# Patient Record
Sex: FEMALE | Race: WHITE | NOT HISPANIC OR LATINO | Employment: UNEMPLOYED | URBAN - METROPOLITAN AREA
[De-identification: names, ages, dates, MRNs, and addresses within clinical notes are randomized per-mention and may not be internally consistent; named-entity substitution may affect disease eponyms.]

---

## 2017-11-05 ENCOUNTER — HOSPITAL ENCOUNTER (INPATIENT)
Facility: HOSPITAL | Age: 42
LOS: 1 days | Discharge: HOME | DRG: 580 | End: 2017-11-06
Attending: EMERGENCY MEDICINE | Admitting: HOSPITALIST
Payer: OTHER GOVERNMENT
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
## 2017-11-05 DIAGNOSIS — S11.90XA DOG BITE OF NECK, INITIAL ENCOUNTER: ICD-10-CM

## 2017-11-05 DIAGNOSIS — L03.221 CELLULITIS OF NECK: Primary | ICD-10-CM

## 2017-11-05 DIAGNOSIS — S11.95XA: ICD-10-CM

## 2017-11-05 PROBLEM — Z72.0 TOBACCO USER: Status: ACTIVE | Noted: 2017-11-05

## 2017-11-05 PROBLEM — R03.0 ELEVATED BLOOD PRESSURE READING: Status: ACTIVE | Noted: 2017-11-05

## 2017-11-05 LAB
ALBUMIN SERPL BCP-MCNC: 4.3 G/DL
ALP SERPL-CCNC: 106 U/L
ALT SERPL W/O P-5'-P-CCNC: 45 U/L
ANION GAP SERPL CALC-SCNC: 15 MMOL/L
AST SERPL-CCNC: 81 U/L
BASOPHILS # BLD AUTO: 0.08 K/UL
BASOPHILS NFR BLD: 0.9 %
BILIRUB SERPL-MCNC: 0.3 MG/DL
BUN SERPL-MCNC: 3 MG/DL
CALCIUM SERPL-MCNC: 10 MG/DL
CHLORIDE SERPL-SCNC: 105 MMOL/L
CO2 SERPL-SCNC: 23 MMOL/L
CREAT SERPL-MCNC: 0.7 MG/DL
DIFFERENTIAL METHOD: ABNORMAL
EOSINOPHIL # BLD AUTO: 0.2 K/UL
EOSINOPHIL NFR BLD: 1.7 %
ERYTHROCYTE [DISTWIDTH] IN BLOOD BY AUTOMATED COUNT: 12.7 %
EST. GFR  (AFRICAN AMERICAN): >60 ML/MIN/1.73 M^2
EST. GFR  (NON AFRICAN AMERICAN): >60 ML/MIN/1.73 M^2
GLUCOSE SERPL-MCNC: 67 MG/DL
HCT VFR BLD AUTO: 45 %
HGB BLD-MCNC: 15.7 G/DL
IMM GRANULOCYTES # BLD AUTO: 0.03 K/UL
IMM GRANULOCYTES NFR BLD AUTO: 0.3 %
LYMPHOCYTES # BLD AUTO: 1.6 K/UL
LYMPHOCYTES NFR BLD: 16.8 %
MCH RBC QN AUTO: 34 PG
MCHC RBC AUTO-ENTMCNC: 34.9 G/DL
MCV RBC AUTO: 97 FL
MONOCYTES # BLD AUTO: 0.7 K/UL
MONOCYTES NFR BLD: 7.6 %
NEUTROPHILS # BLD AUTO: 6.8 K/UL
NEUTROPHILS NFR BLD: 72.7 %
NRBC BLD-RTO: 0 /100 WBC
PLATELET # BLD AUTO: 258 K/UL
PMV BLD AUTO: 9.1 FL
POTASSIUM SERPL-SCNC: 4 MMOL/L
PROT SERPL-MCNC: 8.4 G/DL
RBC # BLD AUTO: 4.62 M/UL
SODIUM SERPL-SCNC: 143 MMOL/L
WBC # BLD AUTO: 9.39 K/UL

## 2017-11-05 PROCEDURE — S0077 INJECTION, CLINDAMYCIN PHOSP: HCPCS | Performed by: PHYSICIAN ASSISTANT

## 2017-11-05 PROCEDURE — 96365 THER/PROPH/DIAG IV INF INIT: CPT

## 2017-11-05 PROCEDURE — 63600175 PHARM REV CODE 636 W HCPCS: Performed by: HOSPITALIST

## 2017-11-05 PROCEDURE — 87077 CULTURE AEROBIC IDENTIFY: CPT

## 2017-11-05 PROCEDURE — 87070 CULTURE OTHR SPECIMN AEROBIC: CPT

## 2017-11-05 PROCEDURE — 99285 EMERGENCY DEPT VISIT HI MDM: CPT | Mod: ,,, | Performed by: PHYSICIAN ASSISTANT

## 2017-11-05 PROCEDURE — 80053 COMPREHEN METABOLIC PANEL: CPT

## 2017-11-05 PROCEDURE — 87076 CULTURE ANAEROBE IDENT EACH: CPT

## 2017-11-05 PROCEDURE — 99222 1ST HOSP IP/OBS MODERATE 55: CPT | Mod: ,,, | Performed by: HOSPITALIST

## 2017-11-05 PROCEDURE — 99285 EMERGENCY DEPT VISIT HI MDM: CPT | Mod: 25

## 2017-11-05 PROCEDURE — 85025 COMPLETE CBC W/AUTO DIFF WBC: CPT

## 2017-11-05 PROCEDURE — 87075 CULTR BACTERIA EXCEPT BLOOD: CPT

## 2017-11-05 PROCEDURE — 96367 TX/PROPH/DG ADDL SEQ IV INF: CPT

## 2017-11-05 PROCEDURE — 0J940ZZ DRAINAGE OF RIGHT NECK SUBCUTANEOUS TISSUE AND FASCIA, OPEN APPROACH: ICD-10-PCS | Performed by: OTOLARYNGOLOGY

## 2017-11-05 PROCEDURE — 87040 BLOOD CULTURE FOR BACTERIA: CPT

## 2017-11-05 PROCEDURE — 25000003 PHARM REV CODE 250: Performed by: PHYSICIAN ASSISTANT

## 2017-11-05 PROCEDURE — 87186 SC STD MICRODIL/AGAR DIL: CPT

## 2017-11-05 PROCEDURE — 87205 SMEAR GRAM STAIN: CPT

## 2017-11-05 PROCEDURE — 12000002 HC ACUTE/MED SURGE SEMI-PRIVATE ROOM

## 2017-11-05 RX ORDER — CIPROFLOXACIN 2 MG/ML
400 INJECTION, SOLUTION INTRAVENOUS
Status: DISCONTINUED | OUTPATIENT
Start: 2017-11-05 | End: 2017-11-06 | Stop reason: HOSPADM

## 2017-11-05 RX ORDER — ACETAMINOPHEN 325 MG/1
650 TABLET ORAL EVERY 8 HOURS PRN
Status: DISCONTINUED | OUTPATIENT
Start: 2017-11-05 | End: 2017-11-06 | Stop reason: HOSPADM

## 2017-11-05 RX ORDER — CLINDAMYCIN PHOSPHATE 900 MG/50ML
900 INJECTION, SOLUTION INTRAVENOUS
Status: DISCONTINUED | OUTPATIENT
Start: 2017-11-06 | End: 2017-11-06 | Stop reason: HOSPADM

## 2017-11-05 RX ORDER — ONDANSETRON 8 MG/1
8 TABLET, ORALLY DISINTEGRATING ORAL EVERY 8 HOURS PRN
Status: DISCONTINUED | OUTPATIENT
Start: 2017-11-05 | End: 2017-11-06 | Stop reason: HOSPADM

## 2017-11-05 RX ORDER — SODIUM CHLORIDE 0.9 % (FLUSH) 0.9 %
3 SYRINGE (ML) INJECTION
Status: DISCONTINUED | OUTPATIENT
Start: 2017-11-05 | End: 2017-11-06 | Stop reason: HOSPADM

## 2017-11-05 RX ORDER — TRAMADOL HYDROCHLORIDE 50 MG/1
50 TABLET ORAL EVERY 6 HOURS PRN
Status: DISCONTINUED | OUTPATIENT
Start: 2017-11-05 | End: 2017-11-06 | Stop reason: HOSPADM

## 2017-11-05 RX ORDER — CLINDAMYCIN PHOSPHATE 900 MG/50ML
900 INJECTION, SOLUTION INTRAVENOUS
Status: COMPLETED | OUTPATIENT
Start: 2017-11-05 | End: 2017-11-05

## 2017-11-05 RX ORDER — RAMELTEON 8 MG/1
8 TABLET ORAL NIGHTLY PRN
Status: DISCONTINUED | OUTPATIENT
Start: 2017-11-05 | End: 2017-11-06 | Stop reason: HOSPADM

## 2017-11-05 RX ADMIN — CIPROFLOXACIN 400 MG: 2 INJECTION, SOLUTION INTRAVENOUS at 10:11

## 2017-11-05 RX ADMIN — CLINDAMYCIN IN 5 PERCENT DEXTROSE 900 MG: 18 INJECTION, SOLUTION INTRAVENOUS at 05:11

## 2017-11-05 NOTE — ED TRIAGE NOTES
Presents to ER for being bitten by her dog on the right submandibular region this past Tuesday.  Today the area slightly painful, swollen and is oozing yellow exudate. Was seen at [facility] today and was given Clindamycin 600mg IM plus a DTAP.    GENERAL: The patient is well-developed and well-nourished in no apparent distress. Alert and oriented x4.                                                HEENT: Head is normocephalic and atraumatic. Extraocular muscles are intact. Pupils are equal, round, and reactive to light and accommodation. Nares appeared normal. Mouth is well hydrated and without lesions. Mucous membranes are moist. Posterior pharynx clear of any exudate or lesions.    NECK: Supple. No carotid bruits. No lymphadenopathy or thyromegaly.    LUNGS: Clear to auscultation.    HEART: Regular rate and rhythm without murmur.     ABDOMEN: Soft, nontender, and nondistended. Positive bowel sounds. No hepatosplenomegaly was noted.     EXTREMITIES: Without any cyanosis, clubbing, rash, lesions or edema.     NEUROLOGIC: Cranial nerves II through XII are grossly intact.     PSYCHIATRIC: Flat affect, but denies suicidal or homicidal ideations.    SKIN: No ulceration or induration present.

## 2017-11-05 NOTE — ED PROVIDER NOTES
Encounter Date: 11/5/2017    SCRIBE #1 NOTE: I, Jim Louis, am scribing for, and in the presence of,  Ruy Hull MD. I have scribed the following portions of the note - the APC attestation.       History     Chief Complaint   Patient presents with    Animal Bite     Patients own dog bit her neck Tuesday.  Sent here for further evaluation and treatment as clinic MD felt she had abscess and cellulitis.  Small punture wounds without drainage.  No airway involvement.     Patient is a 42 year old female with no significant PMHx. She presents to the ED for animal bite. She reports being bitten on the right side of her neck by her own dog on Tuesday. Reports increased neck swelling, redness, and yellow-blood tinged drainage from two puncture wounds. Reports dog's immunizations are up to date. She was seen today at [facility] and received Tdap vaccination and clindamycin 600 mg IM injection. She was instructed to present to ED for higher level of care. She denies dysphagia or limited ROM of neck. She denies fever,chills, nausea, vomiting, sob, chest pain, abd pain, dysuria, diarrhea, or constipation. She is a current everyday smoker and reports alcohol use.             Review of patient's allergies indicates:   Allergen Reactions    Codeine Nausea Only     Past Medical History:   Diagnosis Date    Varicose veins      Past Surgical History:   Procedure Laterality Date    CHOLECYSTECTOMY      laparoscopic    left vein stripping       Family History   Problem Relation Age of Onset    COPD Mother     Lung disease Mother     Heart disease Mother     Cancer Neg Hx      Social History   Substance Use Topics    Smoking status: Current Some Day Smoker    Smokeless tobacco: Never Used      Comment: social smoker    Alcohol use Yes      Comment: occasional     Review of Systems   Constitutional: Negative for fever.   HENT: Negative for sore throat.    Respiratory: Negative for shortness of breath.     Cardiovascular: Negative for chest pain.   Gastrointestinal: Negative for nausea.   Genitourinary: Negative for dysuria.   Musculoskeletal: Negative for back pain.   Skin: Positive for wound (two puncture wounds noted to right side of neck associated with swelling, redness, and drainage). Negative for rash.   Neurological: Negative for weakness.   Hematological: Does not bruise/bleed easily.       Physical Exam     Initial Vitals [11/05/17 1613]   BP Pulse Resp Temp SpO2   (!) 167/97 84 14 99.1 °F (37.3 °C) --      MAP       120.33         Physical Exam    Vitals reviewed.  Constitutional: She appears well-developed and well-nourished. She is not diaphoretic. No distress.   Patient resting comfortably in NAD on RA.    HENT:   Head: Normocephalic and atraumatic.   Nose: Nose normal.   Eyes: Conjunctivae and EOM are normal. Pupils are equal, round, and reactive to light.   Neck: Normal range of motion.   Cardiovascular: Normal rate and regular rhythm. Exam reveals no friction rub.    No murmur heard.  Pulmonary/Chest: Breath sounds normal. No respiratory distress. She has no wheezes. She has no rales.   Abdominal: Soft. Bowel sounds are normal. She exhibits no distension. There is no tenderness. There is no rebound.   Musculoskeletal: Normal range of motion.   Neurological: She is alert and oriented to person, place, and time. She has normal strength. No sensory deficit.   Skin: Skin is warm and dry. Abscess noted. There is erythema.        Psychiatric: Thought content normal. Her mood appears anxious.         ED Course   Procedures  Labs Reviewed   CBC W/ AUTO DIFFERENTIAL - Abnormal; Notable for the following:        Result Value    MCH 34.0 (*)     MPV 9.1 (*)     Lymph% 16.8 (*)     All other components within normal limits   COMPREHENSIVE METABOLIC PANEL - Abnormal; Notable for the following:     Glucose 67 (*)     BUN, Bld 3 (*)     AST 81 (*)     ALT 45 (*)     All other components within normal limits   GRAM  STAIN        Imaging Results          CT Soft Tissue Neck With Contrast (Final result)  Result time 11/06/17 19:50:39    Final result by Guillermo Connor III, MD (11/06/17 19:50:39)                 Impression:     Drainage of a right-sided neck abscess.      Electronically signed by: GUILLERMO CONNOR MD  Date:     11/06/17  Time:    19:50              Narrative:    Findings: Comparison is 11/5/2017.    Findings: There is cellulitis with an underlying air cavity that now appears to communicate with the skin and suggests recent incision and drainage of an abscess. Patient was administered 75 cc of Omnipaque 350. Intracranial, orbits, and skull base show nothing unusual. There are small lymph nodes. Upper mediastinum is normal. Bones reveal mild DJD. Lung apices are clear.                             CT Soft Tissue Neck WO Contrast (Final result)  Result time 11/05/17 19:30:24    Final result by Levon Vazquez MD (11/05/17 19:30:24)                 Impression:      Soft tissue inflammation and pocket of subcutaneous emphysema in the right superolateral neck. No organized fluid collection to suggest abscess.      Electronically signed by: Levon Vazquez  Date:     11/05/17  Time:    19:30              Narrative:    HISTORY: Cellulitis/abscess. Dogbite.    TECHNIQUE: Axial CT images acquired from the skull base through the thoracic inlet without IV contrast.  Multiplanar reconstructions provided for review.    COMPARISON: None.    FINDINGS:   Evaluation for abscess, inflammation, and soft tissue lesion is limited in the absence of IV contrast.    Aerodigestive tract: Normal.    Lymph nodes: No pathologic adenopathy.    Salivary glands: Normal.    Thyroid: Normal.    Thoracic inlet: Mild paraseptal emphysematous changes in the lung apices.    Orbits/paranasal sinuses/skull base: Normal.    Cervical spine: Mild degenerative changes. There is fusion of the C2 and C3 vertebral bodies.    Other findings: There is soft tissue  swelling and a pocket of soft tissue emphysema at the inferolateral aspect of the right mandible (axial image 24). No large organized fluid collection. No large vessels are present in this region.                                       APC / Resident Notes:   Patient is a 42 year old female with no significant PMHx. She presents to the ED for animal bite. Patient is in no acute distress. Vitals stable. AAOx3. RRR. Lungs CTA. Abdomen is soft, non tender, non distended. Skin is normal appearance without rashes. Two puncture wounds noted to right side of neck associated with swelling, erythema, purulent drainage, and induration.    Will order abx, labs, and imaging.     Differential diagnoses include, but are not limited to: animal bite, cellulitis, abscess, or puncture wounds. I considered but do not suspect rabies.     Consulted ENT, awaiting recommendations.   Appreciate ENT consult and recommendations. Their service performed an I&D at bedside, purulence sent to lab.   Will admit to medicine for ID consult for further workup for duration of antibiotics with ENT to follow.     I have discussed and reviewed with my supervising physician.         Attending Attestation:     Physician Attestation Statement for NP/PA:   I have conducted a face to face encounter with this patient in addition to the NP/PA, due to Medical Complexity    Other NP/PA Attestation Additions:    History of Present Illness: 42 y.o. female sustained a dog bite to her neck. This was done by her own dog 5 days ago. She was seen at Urgent Care facility and given Tetanus with Tdap and was given 60 Clindamycin IM, and sent to the ED for further evaluation. Patient has wound with superficial Drainage from the wound to her neck. No voice change. No trismus. No difficulty extending her tongue. Blood work done and blood cultures sent. CT Soft Tissue of the neck obtained showing swelling of that area with a central area of air. Consulted ENT to be admitted  for IV antibiotics.                    ED Course      Clinical Impression:   The primary encounter diagnosis was Cellulitis of neck. Diagnoses of Wound of neck due to animal bite and Dog bite of neck, initial encounter were also pertinent to this visit.    Disposition:   Disposition: Admitted  Condition: Serious                        Umm Pond PA-C  11/06/17 1219       Ruy Hull MD  11/10/17 1108

## 2017-11-06 VITALS
SYSTOLIC BLOOD PRESSURE: 186 MMHG | BODY MASS INDEX: 23.83 KG/M2 | OXYGEN SATURATION: 100 % | TEMPERATURE: 99 F | HEIGHT: 63 IN | WEIGHT: 134.5 LBS | HEART RATE: 70 BPM | RESPIRATION RATE: 18 BRPM | DIASTOLIC BLOOD PRESSURE: 90 MMHG

## 2017-11-06 LAB
GRAM STN SPEC: NORMAL
GRAM STN SPEC: NORMAL

## 2017-11-06 PROCEDURE — 25000003 PHARM REV CODE 250: Performed by: HOSPITALIST

## 2017-11-06 PROCEDURE — 25500020 PHARM REV CODE 255: Performed by: HOSPITALIST

## 2017-11-06 PROCEDURE — 99239 HOSP IP/OBS DSCHRG MGMT >30: CPT | Mod: ,,, | Performed by: HOSPITALIST

## 2017-11-06 PROCEDURE — S0077 INJECTION, CLINDAMYCIN PHOSP: HCPCS | Performed by: HOSPITALIST

## 2017-11-06 PROCEDURE — 63600175 PHARM REV CODE 636 W HCPCS: Performed by: HOSPITALIST

## 2017-11-06 RX ORDER — AMOXICILLIN AND CLAVULANATE POTASSIUM 875; 125 MG/1; MG/1
1 TABLET, FILM COATED ORAL EVERY 12 HOURS
Qty: 14 TABLET | Refills: 0 | Status: SHIPPED | OUTPATIENT
Start: 2017-11-06 | End: 2017-11-27 | Stop reason: SDUPTHER

## 2017-11-06 RX ADMIN — ACETAMINOPHEN 650 MG: 325 TABLET ORAL at 09:11

## 2017-11-06 RX ADMIN — CLINDAMYCIN IN 5 PERCENT DEXTROSE 900 MG: 18 INJECTION, SOLUTION INTRAVENOUS at 01:11

## 2017-11-06 RX ADMIN — CLINDAMYCIN IN 5 PERCENT DEXTROSE 900 MG: 18 INJECTION, SOLUTION INTRAVENOUS at 09:11

## 2017-11-06 RX ADMIN — CLINDAMYCIN IN 5 PERCENT DEXTROSE 900 MG: 18 INJECTION, SOLUTION INTRAVENOUS at 05:11

## 2017-11-06 RX ADMIN — CIPROFLOXACIN 400 MG: 2 INJECTION, SOLUTION INTRAVENOUS at 11:11

## 2017-11-06 RX ADMIN — IOHEXOL 75 ML: 350 INJECTION, SOLUTION INTRAVENOUS at 07:11

## 2017-11-06 NOTE — ED NOTES
Pt resting in bed. No distress noted. RR even and unlabored. Bed in low locked position. Side rails up x2. Call bell within reach.

## 2017-11-06 NOTE — ASSESSMENT & PLAN NOTE
- from dog bite, CT head was done showing soft tissue inflammation and pocket of subcutaneous emphysema in the right superolateral neck  - ENT consulted, I&D performed at bedside, purulence sent to lab,  They wanted aggressive antibiotic therapy due to free air in the neck with gas forming organisms.   - Consider CT neck WITH contrast to better evaluate neck structures tomorrow with packing removed in neck, may need to contact ENT for this  - Will start patient on IV Clindamycin and IV Cipro for broad coverage for dog bite  - Consider change to PO Clindamycin once work up so completed    11/6 - repeat CT w contrast.  Will dc on augmentin

## 2017-11-06 NOTE — PROGRESS NOTES
[facility]  Otorhinolaryngology-Head & Neck Surgery  Progress Note    Subjective:     Post-Op Info:  * No surgery found *      Hospital Day: 2     Interval History: NAEON. Improved swelling.    Medications:  Continuous Infusions:   Scheduled Meds:   ciprofloxacin  400 mg Intravenous Q12H    clindamycin 900 MG/50 ML D5W  900 mg Intravenous Q8H     PRN Meds:acetaminophen, ondansetron, ramelteon, sodium chloride 0.9%, traMADol     Review of patient's allergies indicates:   Allergen Reactions    Codeine Nausea Only     Objective:     Vital Signs (24h Range):  Temp:  [99.1 °F (37.3 °C)-99.6 °F (37.6 °C)] 99.6 °F (37.6 °C)  Pulse:  [66-90] 72  Resp:  [12-18] 12  SpO2:  [97 %-99 %] 98 %  BP: (135-167)/(89-98) 135/89       Date 11/06/17 0700 - 11/07/17 0659   Shift 6577-7035 9529-3282 9990-3623 24 Hour Total   I  N  T  A  K  E   P.O. 250   250    IV Piggyback 50   50    Shift Total  (mL/kg) 300  (4.9)   300  (4.9)   O  U  T  P  U  T   Urine  (mL/kg/hr) 350   350    Shift Total  (mL/kg) 350  (5.7)   350  (5.7)   Weight (kg) 61.2 61.2 61.2 61.2     Lines/Drains/Airways     Peripheral Intravenous Line                 Peripheral IV - Single Lumen 11/05/17 1700 Left Forearm less than 1 day                Physical Exam  NAD  Incision over right neck w/ packing in place, minimal purulent drainage  Decreased induration  Normal WOB    Assessment/Plan:     * Cellulitis of neck    Assessment: 41 yo F with superficial neck abscess and cellulitis after dog bite on 10/31/17. tDap received. On IV Clinda. CT with free air in neck.      Plan:   - Will f/u cultures  - Recommend repeat CT w/ contrast; remove packing prior to scan  - Patient will need additional IV antibiotics  - Packing will be slowly removed  - ENT will follow              Jacques Parks MD  Otorhinolaryngology-Head & Neck Surgery  [facility]

## 2017-11-06 NOTE — H&P
[facility] Hospital Medicine  History & Physical    Patient Name: Alis Villagomez  MRN: [id number]  Admission Date: 11/5/2017  Attending Physician: Starr Guerra MD   Primary Care Provider: Rochelle Byrd MD    [facility] Medicine Team: [facility] Kip Louis MD     Patient information was obtained from patient, past medical records and ER records.     Subjective:     Principal Problem:Cellulitis of neck    Chief Complaint:   Chief Complaint   Patient presents with    Animal Bite     Patients own dog bit her neck Tuesday.  Sent here for further evaluation and treatment as clinic MD felt she had abscess and cellulitis.  Small punture wounds without drainage.  No airway involvement.        HPI: This is a 42 year old female with no significant PMHx who is being admitted to hospital medicine for cellulitis. She reports being bitten on the right side of her neck by her own dog on Tuesday. Reports increased neck swelling, redness, and yellow-blood tinged drainage from two puncture wounds. Reports dog's immunizations are up to date. She was seen today at [facility] and received Tdap vaccination and clindamycin 600 mg IM injection. She was instructed to present to ED for higher level of care. She denies dysphagia or limited ROM of neck. She denies fever,chills, nausea, vomiting, sob, chest pain, abd pain, dysuria, diarrhea, or constipation. She is a current everyday smoker and reports some alcohol use. She denies DM, HTN, HLD. She is not on any medicaitons.  ENT consulted as CT neck showed soft tissue inflammation and pocket of subcutaneous emphysema in the right superolateral neck. I&D was done at bedside.       Past Medical History:   Diagnosis Date    Varicose veins        Past Surgical History:   Procedure Laterality Date    CHOLECYSTECTOMY      laparoscopic    left vein stripping         Review of patient's allergies indicates:   Allergen Reactions    Codeine Nausea  Only       No current facility-administered medications on file prior to encounter.      Current Outpatient Prescriptions on File Prior to Encounter   Medication Sig    ibuprofen (ADVIL,MOTRIN) 200 MG tablet Take 200 mg by mouth every 6 (six) hours as needed.     Family History     Problem Relation (Age of Onset)    COPD Mother    Heart disease Mother    Lung disease Mother        Social History Main Topics    Smoking status: Current Some Day Smoker    Smokeless tobacco: Never Used      Comment: social smoker    Alcohol use Yes      Comment: occasional    Drug use: No    Sexual activity: Yes     Partners: Male     Birth control/ protection: None     Review of Systems   Constitutional: Negative for chills and fatigue.   HENT: Positive for facial swelling. Negative for congestion, trouble swallowing and voice change.    Eyes: Negative for visual disturbance.   Respiratory: Negative for chest tightness, shortness of breath and wheezing.    Cardiovascular: Negative for chest pain, palpitations and leg swelling.   Gastrointestinal: Negative for abdominal pain.   Endocrine: Negative for polydipsia and polyuria.   Genitourinary: Negative for dysuria.   Musculoskeletal: Positive for neck pain. Negative for back pain and joint swelling.   Skin: Positive for wound.   Allergic/Immunologic: Negative.  Negative for immunocompromised state.   Neurological: Negative for facial asymmetry, weakness and numbness.   Hematological: Negative.  Negative for adenopathy.   Psychiatric/Behavioral: Negative.  Negative for confusion. The patient is not nervous/anxious.      Objective:     Vital Signs (Most Recent):  Temp: 99.1 °F (37.3 °C) (11/05/17 1613)  Pulse: 84 (11/05/17 1613)  Resp: 14 (11/05/17 1613)  BP: (!) 167/97 (11/05/17 1613) Vital Signs (24h Range):  Temp:  [99.1 °F (37.3 °C)] 99.1 °F (37.3 °C)  Pulse:  [84] 84  Resp:  [14] 14  BP: (167)/(97) 167/97     Weight: 61.2 kg (135 lb)  Body mass index is 23.91 kg/m².    Physical  Exam  Constitutional: She appears well-developed and well-nourished. She is not diaphoretic. No distress.   Patient resting comfortably in NAD on RA.    HENT:   Head: Normocephalic and atraumatic.   Nose: Nose normal.   Eyes: Conjunctivae and EOM are normal. Pupils are equal, round, and reactive to light.   Neck: Normal range of motion.   Cardiovascular: Normal rate and regular rhythm. Exam reveals no friction rub.    No murmur heard.  Pulmonary/Chest: Breath sounds normal. No respiratory distress. She has no wheezes. She has no rales.   Abdominal: Soft. Bowel sounds are normal. She exhibits no distension. There is no tenderness. There is no rebound.   Musculoskeletal: Normal range of motion.   Neurological: She is alert and oriented to person, place, and time. She has normal strength. No sensory deficit.   Skin: Skin is warm and dry. Abscess in R side of her neck (post I&D). There is erythema  .   Significant Labs:   CBC:   Recent Labs  Lab 11/05/17  1700   WBC 9.39   HGB 15.7   HCT 45.0        CMP:   Recent Labs  Lab 11/05/17  1700      K 4.0      CO2 23   GLU 67*   BUN 3*   CREATININE 0.7   CALCIUM 10.0   PROT 8.4   ALBUMIN 4.3   BILITOT 0.3   ALKPHOS 106   AST 81*   ALT 45*   ANIONGAP 15   EGFRNONAA >60.0       Significant Imaging: I have reviewed all pertinent imaging results/findings within the past 24 hours.    Assessment/Plan:     * Cellulitis of neck    - from dog bite, CT head was done showing soft tissue inflammation and pocket of subcutaneous emphysema in the right superolateral neck  - ENT consulted, I&D performed at bedside, purulence sent to lab,  They wanted aggressive antibiotic therapy due to free air in the neck with gas forming organisms.   - Consider CT neck WITH contrast to better evaluate neck structures tomorrow with packing removed in neck, may need to contact ENT for this  - Will start patient on IV Clindamycin and IV Cipro for broad coverage for dog bite  - Consider  change to PO Clindamycin once work up so completed          Tobacco user    - patient report 0.5 ppd  - does not need nicotine patch          Elevated blood pressure reading    - patient with no hx, not on medications  - will monitor          Dog bite of neck    - her dog, up to date with immunization  - TDAP was given at urgent care 11/5            VTE Risk Mitigation         Ordered     Medium Risk of VTE  Once      11/05/17 7964             Kip Louis MD  Department of Hospital Medicine   [facility]

## 2017-11-06 NOTE — SUBJECTIVE & OBJECTIVE
Past Medical History:   Diagnosis Date    Varicose veins        Past Surgical History:   Procedure Laterality Date    CHOLECYSTECTOMY      laparoscopic    left vein stripping         Review of patient's allergies indicates:   Allergen Reactions    Codeine Nausea Only       No current facility-administered medications on file prior to encounter.      Current Outpatient Prescriptions on File Prior to Encounter   Medication Sig    ibuprofen (ADVIL,MOTRIN) 200 MG tablet Take 200 mg by mouth every 6 (six) hours as needed.     Family History     Problem Relation (Age of Onset)    COPD Mother    Heart disease Mother    Lung disease Mother        Social History Main Topics    Smoking status: Current Some Day Smoker    Smokeless tobacco: Never Used      Comment: social smoker    Alcohol use Yes      Comment: occasional    Drug use: No    Sexual activity: Yes     Partners: Male     Birth control/ protection: None     Review of Systems   Constitutional: Negative for chills and fatigue.   HENT: Positive for facial swelling. Negative for congestion, trouble swallowing and voice change.    Eyes: Negative for visual disturbance.   Respiratory: Negative for chest tightness, shortness of breath and wheezing.    Cardiovascular: Negative for chest pain, palpitations and leg swelling.   Gastrointestinal: Negative for abdominal pain.   Endocrine: Negative for polydipsia and polyuria.   Genitourinary: Negative for dysuria.   Musculoskeletal: Positive for neck pain. Negative for back pain and joint swelling.   Skin: Positive for wound.   Allergic/Immunologic: Negative.  Negative for immunocompromised state.   Neurological: Negative for facial asymmetry, weakness and numbness.   Hematological: Negative.  Negative for adenopathy.   Psychiatric/Behavioral: Negative.  Negative for confusion. The patient is not nervous/anxious.      Objective:     Vital Signs (Most Recent):  Temp: 99.1 °F (37.3 °C) (11/05/17 1613)  Pulse: 84  (11/05/17 1613)  Resp: 14 (11/05/17 1613)  BP: (!) 167/97 (11/05/17 1613) Vital Signs (24h Range):  Temp:  [99.1 °F (37.3 °C)] 99.1 °F (37.3 °C)  Pulse:  [84] 84  Resp:  [14] 14  BP: (167)/(97) 167/97     Weight: 61.2 kg (135 lb)  Body mass index is 23.91 kg/m².    Physical Exam  Constitutional: She appears well-developed and well-nourished. She is not diaphoretic. No distress.   Patient resting comfortably in NAD on RA.    HENT:   Head: Normocephalic and atraumatic.   Nose: Nose normal.   Eyes: Conjunctivae and EOM are normal. Pupils are equal, round, and reactive to light.   Neck: Normal range of motion.   Cardiovascular: Normal rate and regular rhythm. Exam reveals no friction rub.    No murmur heard.  Pulmonary/Chest: Breath sounds normal. No respiratory distress. She has no wheezes. She has no rales.   Abdominal: Soft. Bowel sounds are normal. She exhibits no distension. There is no tenderness. There is no rebound.   Musculoskeletal: Normal range of motion.   Neurological: She is alert and oriented to person, place, and time. She has normal strength. No sensory deficit.   Skin: Skin is warm and dry. Abscess in R side of her neck (post I&D). There is erythema  .   Significant Labs:   CBC:   Recent Labs  Lab 11/05/17  1700   WBC 9.39   HGB 15.7   HCT 45.0        CMP:   Recent Labs  Lab 11/05/17  1700      K 4.0      CO2 23   GLU 67*   BUN 3*   CREATININE 0.7   CALCIUM 10.0   PROT 8.4   ALBUMIN 4.3   BILITOT 0.3   ALKPHOS 106   AST 81*   ALT 45*   ANIONGAP 15   EGFRNONAA >60.0       Significant Imaging: I have reviewed all pertinent imaging results/findings within the past 24 hours.

## 2017-11-06 NOTE — ASSESSMENT & PLAN NOTE
Assessment: 41 yo F with superficial neck abscess and cellulitis after dog bite on 10/31/17. tDap received. On IV Clinda. CT with free air in neck.      Plan:   - Will f/u cultures  - Recommend repeat CT w/ contrast; remove packing prior to scan  - Patient will need additional IV antibiotics  - Packing will be slowly removed  - ENT will follow

## 2017-11-06 NOTE — HOSPITAL COURSE
11/5/2017: admit to [facility] for management of cellulitis  11/6 - pt planning to leave for [facility] tomorrow.  Discussed this w ENT.  willl order CT w contrast now.  Cultures still negative.  DOC for dog bites is augmentin.   CT reviewed by ENT:  Ok to send home.

## 2017-11-06 NOTE — SUBJECTIVE & OBJECTIVE
Interval History: NAEON. Improved swelling.    Medications:  Continuous Infusions:   Scheduled Meds:   ciprofloxacin  400 mg Intravenous Q12H    clindamycin 900 MG/50 ML D5W  900 mg Intravenous Q8H     PRN Meds:acetaminophen, ondansetron, ramelteon, sodium chloride 0.9%, traMADol     Review of patient's allergies indicates:   Allergen Reactions    Codeine Nausea Only     Objective:     Vital Signs (24h Range):  Temp:  [99.1 °F (37.3 °C)-99.6 °F (37.6 °C)] 99.6 °F (37.6 °C)  Pulse:  [66-90] 72  Resp:  [12-18] 12  SpO2:  [97 %-99 %] 98 %  BP: (135-167)/(89-98) 135/89       Date 11/06/17 0700 - 11/07/17 0659   Shift 4397-1850 7494-5332 8358-4738 24 Hour Total   I  N  T  A  K  E   P.O. 250   250    IV Piggyback 50   50    Shift Total  (mL/kg) 300  (4.9)   300  (4.9)   O  U  T  P  U  T   Urine  (mL/kg/hr) 350   350    Shift Total  (mL/kg) 350  (5.7)   350  (5.7)   Weight (kg) 61.2 61.2 61.2 61.2     Lines/Drains/Airways     Peripheral Intravenous Line                 Peripheral IV - Single Lumen 11/05/17 1700 Left Forearm less than 1 day                Physical Exam  NAD  Incision over right neck w/ packing in place, minimal purulent drainage  Decreased induration  Normal WOB

## 2017-11-06 NOTE — CONSULTS
Otolaryngology - Head and Neck Surgery  Consultation Report    Consultation From:   ED    Chief Complaint:   Dog bite    History of Present Illness:   42 y.o. year old female with a PMH significant for dog bite to the neck on 10/20/17 presents to the ED after evaluation at  for neck swelling. Patient reports having a dog bite on 10/30. Did not seek medical care. Decided to go to  today after neck started to drain fluid. Pt was sent to [facility] and a CT scan WO contrast was obtained. ENT consulted for possible abscess. Patient reports tenderness over the bite and surrounding swelling and erythema. No fevers reported at home. No neck stiffness, full ROM reported by patient. Dog is up today on its vaccinations. She received tetanus at .     Review of Systems  Negative except stated in HPI    Past Medical History:   Patient  has a past medical history of Varicose veins.    Past Surgical History:   Patient  has a past surgical history that includes Cholecystectomy and left vein stripping.    Social History:   Patient  reports that she has been smoking.  She has never used smokeless tobacco. She reports that she drinks alcohol. She reports that she does not use drugs.    Family History:   family history includes COPD in her mother; Heart disease in her mother; Lung disease in her mother.    Medications:       Allergies:   Patient is allergic to codeine.    Physical Exam:  Temp:  [99.1 °F (37.3 °C)] 99.1 °F (37.3 °C)  Pulse:  [84] 84  Resp:  [14] 14  BP: (167)/(97) 167/97    General: AAOx3, NAD. Voice normal.   Right Ear: External Auditory Canal WNL,TM w/o masses/lesions/perforations/effusions.  Left Ear: External Auditory Canal WNL,TM w/o masses/lesions/perforations/effusions.  Nose: No gross nasal septal deviation. Inferior turbinates WNL bilaterally. No septal perforation or hematomas. No masses/lesions.  Oral Cavity: FOM Soft, no masses palpated. Oral tongue mobile. Hard palate WNL.  Oropharynx: BOT WNL. No  masses/lesions noted. Tonsillar fossa without lesions. Soft palate without masses. Midline uvula.  Neck: No palpable lymphadenopathy at levels I-VI. Full ROM. No thyroid nodules palpated. Right neck swelling with overlying erythema and induration.   Face: HB I bilaterally. Normal sensation.  Eyes: EOM intact bilaterally, PERRLA bilaterally. No exophthalmos/enopthalmos.  Respiratory: No stridor, symmetrical chest expansion, good air movement.   Cardiovascular: Pulses 2+/=. No UE edema.  Psych: Appropriate behavior.   Neuro: CN II-XII grossly intact.  Dermatologic: No new skin rashes or lesions.     CBC    Recent Labs  Lab 11/05/17  1700   WBC 9.39   HGB 15.7   HCT 45.0   MCV 97        BMP    Recent Labs  Lab 11/05/17  1700   GLU 67*      K 4.0      CO2 23   BUN 3*   CREATININE 0.7   CALCIUM 10.0     CT neck: Soft tissue inflammation and pocket of subcutaneous emphysema in the right superolateral neck. No organized fluid collection to suggest abscess.    Assessment:   Ms Villagomez is a 43 yo F with superficial neck abscess and cellulitis after dog bite on 10/31/17. tDap received. On IV Clinda. CT with free air in neck.     Plan:   - I&D performed at bedside, purulence sent to lab  - F/u GS and cultures  - Patient will need additional IV antibiotics  - Recommend medicine admit and ID consult for further workup for duration of antibiotics, consider aggressive antibiotic therapy due to free air in the neck with gas forming organisms.   - Recommend CT neck WITH contrast to better evaluate neck structures. Will need packing removed in neck before scan   - Packing will be slowly removed  - ENT will follow    Suleiman Sawyer MD PGY-IV  [facility] Otolaryngology  [phone]

## 2017-11-06 NOTE — ASSESSMENT & PLAN NOTE
- from dog bite, CT head was done showing soft tissue inflammation and pocket of subcutaneous emphysema in the right superolateral neck  - ENT consulted, I&D performed at bedside, purulence sent to lab,  They wanted aggressive antibiotic therapy due to free air in the neck with gas forming organisms.   - Consider CT neck WITH contrast to better evaluate neck structures tomorrow with packing removed in neck, may need to contact ENT for this  - Will start patient on IV Clindamycin and IV Cipro for broad coverage for dog bite  - Consider change to PO Clindamycin once work up so completed

## 2017-11-07 NOTE — PHYSICIAN QUERY
"PT Name: Alis Villagomez  MR #: [id number]    Physician Query Form - Procedure Clarification     CDS/: Dustin Dougherty RN               Contact information   [email or web address]:  This form is a permanent document in the medical record.     Query Date: November 7, 2017  By submitting this query, we are merely seeking further clarification of documentation. Please utilize your independent clinical judgment when addressing the question(s) below.    The Medical record contains the following:     Indicators       Supporting Clinical Findings   Location in Medical Record    Documentation of "Debridement"        Documentation of "I & D" I&D was done at bedside.     - I&D performed at bedside, purulence sent to lab    DC    Consult 11/5    EBL =      Other: Will need packing removed in neck before scan - Packing will be slowly removed     There is cellulitis with an underlying air cavity that now appears to communicate with the skin and suggests recent incision and drainage of an abscess.    Consult  11/5      CT soft tissue neck with contrast  11/6     Excisional debridement is a surgical removal of  nonvitalized tissue, necrosis or slough. The use of a sharp instrument does not always indicate that an excisional debridement was performed.  Non excisional debridement is the scraping away or removal of loose tissue fragments.    Provider, please specify type of procedure(s) performed:    [  ] Excisional Debridement (Specify site, type, depth of tissue removal ,instrument, size of wound & EBL)   * Site: (Specify)_____________________________________   * Depth of tissue excised:    [  ] Skin/Subcutaneous [ ] Soft tissue [ ] Fascia [ ] Muscle [ ] Bone   * Instrument used:    [  ] Scalpel [ ] Scissors [ ] Curette [ ] Other (Specify) ____________________   * Size of wound after debridement: (Specify) _____________________________   * EBL (Specify) __________________________________    [  ] Non Excisional " Debridement   * Depth of tissue debrided:    [  ] Skin/Subcutaneous [ ] Soft tissue [ ] Fascia [ ] Muscle [ ] Bone    [x  ] Incision and Drainage    [  ] Other Procedure (Specify) ________________________________    [  ] Clinically Undetermined

## 2017-11-07 NOTE — PLAN OF CARE
Plan is to discharge home with no needs.     11/07/17 0725   Final Note   Assessment Type Final Discharge Note   Discharge Disposition Home   Hospital Follow Up  Appt(s) scheduled? No   Discharge plans and expectations educations in teach back method with documentation complete? Yes   Right Care Referral Info   Post Acute Recommendation No Care

## 2017-11-07 NOTE — PLAN OF CARE
Problem: Patient Care Overview  Goal: Plan of Care Review  Outcome: Ongoing (interventions implemented as appropriate)  Safety:  call light in reach, patient oriented to room & instructed how to notify nurse if assistance is needed, questions & concerns addressed, bed in lowest position with wheels locked & side rails up X 2, fall precautions followed, patient free from fall & injury thus far this shift;  VTE/bleeding precautions maintained.  Activity:  patient up ad shantal, weight shifted at least every other hour.  Neurological:  patient A&O X 4, follows commands, equal  strength & dorsi/plantarflexion, neuro checks performed as ordered & WDL.  Respiratory:  patient tolerates room air without distress, denies SOB.  Cardiac:  Denies chest pain, BP stable.  HR stable.  Afebrile this shift.  GI:  Patient tolerates PO intake well, denies nausea, LBM 11/5/2017.  :  patient voids clear yellow urine without foul odor spontaneously & without difficulty, adequate output for shift.  Skin:  Removed packing to R neck wound as per orders for CT scan, notified NOC shift RN that if packing needs to be re done then it will have to be ordered by MD.  Devices:  PIV CDI, negative for s/sx of infection & infiltration.  Pain:  patient denies pain.  Pt going down for CT as ordered, notified Dr. Guerra this afternoon that patient has AM flight to [place] and needs to be DC, Dr. Guerra stated that DC would depend on results of CT scan.

## 2017-11-07 NOTE — DISCHARGE SUMMARY
[facility] Hospital Medicine  Discharge Summary      Patient Name: Alis Villagomez  MRN: [id number]  Admission Date: 11/5/2017  Hospital Length of Stay: 1 days  Discharge Date and Time: No discharge date for patient encounter.  Attending Physician: Starr Guerra MD   Discharging Provider: Starr Guerra MD  Primary Care Provider: Rochelle Byrd MD  Hospital Medicine Team: [facility] MED G Starr Guerra MD    HPI:   This is a 42 year old female with no significant PMHx who is being admitted to hospital medicine for cellulitis. She reports being bitten on the right side of her neck by her own dog on Tuesday. Reports increased neck swelling, redness, and yellow-blood tinged drainage from two puncture wounds. Reports dog's immunizations are up to date. She was seen today at [facility] and received Tdap vaccination and clindamycin 600 mg IM injection. She was instructed to present to ED for higher level of care. She denies dysphagia or limited ROM of neck. She denies fever,chills, nausea, vomiting, sob, chest pain, abd pain, dysuria, diarrhea, or constipation. She is a current everyday smoker and reports some alcohol use. She denies DM, HTN, HLD. She is not on any medicaitons.  ENT consulted as CT neck showed soft tissue inflammation and pocket of subcutaneous emphysema in the right superolateral neck. I&D was done at bedside.       * No surgery found *      Hospital Course:   11/5/2017: admit to [facility] for management of cellulitis  11/6 - pt planning to leave for [facility] tomorrow.  Discussed this w ENT.  willl order CT w contrast now.  Cultures still negative.  DOC for dog bites is augmentin.   CT reviewed by ENT:  Ok to send home.       Consults:   Consults         Status Ordering Provider     Inpatient consult to ENT  Once     Provider:  (Not yet assigned)    MUNA Mckeon          * Cellulitis of neck    - from dog bite, CT head was done showing soft tissue  inflammation and pocket of subcutaneous emphysema in the right superolateral neck  - ENT consulted, I&D performed at bedside, purulence sent to lab,  They wanted aggressive antibiotic therapy due to free air in the neck with gas forming organisms.   - Consider CT neck WITH contrast to better evaluate neck structures tomorrow with packing removed in neck, may need to contact ENT for this  - Will start patient on IV Clindamycin and IV Cipro for broad coverage for dog bite  - Consider change to PO Clindamycin once work up so completed    11/6 - repeat CT w contrast.  Will dc on augmentin          Tobacco user    - patient report 0.5 ppd  - does not need nicotine patch          Elevated blood pressure reading    - patient with no hx, not on medications  - will monitor          Dog bite of neck    - her dog, up to date with immunization  - TDAP was given at urgent care 11/5            Final Active Diagnoses:    Diagnosis Date Noted POA    PRINCIPAL PROBLEM:  Cellulitis of neck [L03.221] 11/05/2017 Yes    Dog bite of neck [S11.90XA, W54.0XXA] 11/05/2017 Yes    Elevated blood pressure reading [R03.0] 11/05/2017 Yes    Tobacco user [Z72.0] 11/05/2017 Yes      Problems Resolved During this Admission:    Diagnosis Date Noted Date Resolved POA       Discharged Condition: good    Disposition: Home or Self Care    Follow Up:  Follow-up Information     Rochelle Byrd MD.    Specialty:  Family Medicine  Contact information:  [address] [facility] [address]  [phone]             Rochelle Byrd MD.    Specialty:  Family Medicine  Contact information:  [address] [facility] [address]  [phone]                 Patient Instructions:     Diet general     Activity as tolerated     Other restrictions (specify):   Order Comments: Wound care:  Keep clean and dry, wash w water daily, apply topical antibacterial ointment.  Cover.         Significant Diagnostic Studies: Labs:   [facility]   Recent Labs  Lab 11/05/17  1700   NA  143   K 4.0      CO2 23   GLU 67*   BUN 3*   CREATININE 0.7   CALCIUM 10.0   PROT 8.4   ALBUMIN 4.3   BILITOT 0.3   ALKPHOS 106   AST 81*   ALT 45*   ANIONGAP 15   ESTGFRAFRICA >60.0   EGFRNONAA >60.0    and CBC   Recent Labs  Lab 11/05/17  1700   WBC 9.39   HGB 15.7   HCT 45.0          Pending Diagnostic Studies:     None         Medications:  Reconciled Home Medications:   Current Discharge Medication List      START taking these medications    Details   amoxicillin-clavulanate 875-125mg (AUGMENTIN) 875-125 mg per tablet Take 1 tablet by mouth every 12 (twelve) hours.  Qty: 14 tablet, Refills: 0         STOP taking these medications       ibuprofen (ADVIL,MOTRIN) 200 MG tablet Comments:   Reason for Stopping:               Indwelling Lines/Drains at time of discharge:   Lines/Drains/Airways          No matching active lines, drains, or airways          Time spent on the discharge of patient: 40  minutes  Patient was seen and examined on the date of discharge and determined to be suitable for discharge.         Starr Guerra MD  Department of Hospital Medicine  [facility]

## 2017-11-07 NOTE — PLAN OF CARE
Problem: Patient Care Overview  Goal: Plan of Care Review  Outcome: Outcome(s) achieved Date Met: 11/06/17  Pt to be D/C to home via personal transport. Spouse at BS.    VSS and afebrile. Free from injury and falls. AOx4.    AVS to be provided c/ pertinent F/U info indicated.

## 2017-11-07 NOTE — PROGRESS NOTES
Spoke with ANNIKA Stanton regarding pt  requesting that pt be d/c tonight to catch a 5am flight out of the country. ANNIKA Stanton will contact Dr. Guerra to discuss further.

## 2017-11-08 LAB
BACTERIA SPEC AEROBE CULT: NORMAL
BACTERIA SPEC AEROBE CULT: NORMAL

## 2017-11-09 LAB
BACTERIA SPEC ANAEROBE CULT: NORMAL

## 2017-11-10 LAB
BACTERIA BLD CULT: NORMAL
BACTERIA BLD CULT: NORMAL

## 2017-11-11 LAB — BACTERIA BLD CULT: NORMAL

## 2017-11-17 NOTE — PHYSICIAN QUERY
PT Name: Alis Villagomez  MR #: [id number]     Physician Query Form - Documentation Clarification      CDS/: Zee Jimenes RN  CCDS               Contact information: [email or web address]    This form is a permanent document in the medical record.     Query Date: November 17, 2017    By submitting this query, we are merely seeking further clarification of documentation. Please utilize your independent clinical judgment when addressing the question(s) below.    The Medical record reflects the following:    Supporting Clinical Findings Location in Medical Record    42 year old female with no significant PMHx who is being admitted to hospital medicine for cellulitis. She reports being bitten on the right side of her neck by her own dog   ENT consulted as CT neck showed soft tissue inflammation and pocket of subcutaneous emphysema in the right superolateral neck. I&D was done at bedside.    ENT consulted, I&D performed at bedside, purulence sent to lab, They wanted aggressive antibiotic therapy due to free air in the neck with gas forming organisms     Soft tissue inflammation and pocket of subcutaneous emphysema in the right superolateral neck.     Pasteurella species - rare, Prevotella species - rare  H&P                         CT report 11/5         Cultures 11/5    PRINCIPAL PROBLEM: Cellulitis of neck    Dog bite of neck   Discharge Summary                                                                          Doctor, please specify diagnosis or diagnoses associated with above clinical findings.  Please clarify the clinical significance of the subcutaneous emphysema in the right superolateral neck:    Provider Use Only        [ x ]  SQ emphysema is clinically significant, and was monitored and treated      [  ]  SQ emphysema is not clinically significant, and was not monitored or treated      [  ]  Clinically undetermined

## 2017-11-27 ENCOUNTER — OFFICE VISIT (OUTPATIENT)
Dept: FAMILY MEDICINE | Facility: CLINIC | Age: 42
End: 2017-11-27
Payer: OTHER GOVERNMENT

## 2017-11-27 VITALS
OXYGEN SATURATION: 96 % | TEMPERATURE: 98 F | BODY MASS INDEX: 23.29 KG/M2 | WEIGHT: 136.44 LBS | DIASTOLIC BLOOD PRESSURE: 70 MMHG | HEIGHT: 64 IN | SYSTOLIC BLOOD PRESSURE: 120 MMHG | HEART RATE: 70 BPM

## 2017-11-27 DIAGNOSIS — S11.90XS: Primary | ICD-10-CM
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
## 2017-11-27 PROCEDURE — 99215 OFFICE O/P EST HI 40 MIN: CPT | Mod: PBBFAC,PO | Performed by: FAMILY MEDICINE

## 2017-11-27 PROCEDURE — 99204 OFFICE O/P NEW MOD 45 MIN: CPT | Mod: S$PBB,,, | Performed by: FAMILY MEDICINE

## 2017-11-27 PROCEDURE — 99999 PR PBB SHADOW E&M-EST. PATIENT-LVL V: CPT | Mod: PBBFAC,,, | Performed by: FAMILY MEDICINE

## 2017-11-27 RX ORDER — AMOXICILLIN AND CLAVULANATE POTASSIUM 875; 125 MG/1; MG/1
1 TABLET, FILM COATED ORAL EVERY 12 HOURS
Qty: 20 TABLET | Refills: 0 | Status: SHIPPED | OUTPATIENT
Start: 2017-11-27 | End: 2017-11-30 | Stop reason: SDUPTHER

## 2017-11-27 NOTE — PATIENT INSTRUCTIONS
Please keep appointment 11/30/17 at 1 PM with Dr. Polk at the [facility], first floor.

## 2017-11-27 NOTE — PROGRESS NOTES
Subjective:       Patient ID: Alis Villagomez is a 42 y.o. female.    Chief Complaint: Establish Brennen Snell is a 42 y.o. female who presents today with hospital follow up for dog bite and cellulitis. She was bitten by her dog on 11/5/17. She went to an urgent care and was sent to the [facility] where she was admitted for cellulitis and subcutaneous emphysema. She was given IV antibiotics and and I and D was performed at bedside. She was in the hospital for two days, after which she was sent home with augmentin x 10 days. She went [place] right after discharge from the hospital and she had no issues. She has had a increase in swelling in her neck at the site of the bite over the last two days. The swelling had initially completely resolved but then seems to have returned.        She denies dysphagia or limited ROM of neck. She denies fever,chills, nausea, vomiting, sob, chest pain, abd pain, dysuria, diarrhea, or constipation.       Review of Systems    As above    Result Value Ref Range    Blood Culture, Routine No growth after 5 days.    Blood culture   Result Value Ref Range    Blood Culture, Routine No growth after 5 days.    Aerobic culture   Result Value Ref Range    Aerobic Bacterial Culture       PASTEURELLA SPECIES  Rare  Pasteurella canis  Beta Lactamase negative      Aerobic Bacterial Culture       PASTEURELLA SPECIES  Rare  Pasteurella dagmatis  Beta Lactamase negative     Gram stain   Result Value Ref Range    Gram Stain Result No WBC's     Gram Stain Result No organisms seen        Objective:     Vitals:    11/27/17 1416   BP: 120/70   Pulse: 70   Temp: 98.2 °F (36.8 °C)        Physical Exam   Constitutional: She is oriented to person, place, and time. She appears well-developed and well-nourished.   Neck: Normal range of motion. Neck supple.   2.5 by 2 cm indurated lesion on right neck   Cardiovascular: Normal rate and regular rhythm.     Pulmonary/Chest: Effort normal and breath sounds normal.   Lymphadenopathy:     She has no cervical adenopathy.     She has no axillary adenopathy.   Neurological: She is alert and oriented to person, place, and time.   Skin: Rash noted. No erythema.   Psychiatric: She has a normal mood and affect. Her behavior is normal. Judgment and thought content normal.   Nursing note and vitals reviewed.      Assessment:       1. Dog bite of neck, sequela        Plan:       Dog bite of neck, sequela  Discussed case with ID, will restart augmentin  Call out to ENT, call back pending  Apt scheduled with ID on 11/30/17 at 1 PM  -     Ambulatory referral to ENT  -     Ambulatory referral to Infectious Disease  -     amoxicillin-clavulanate 875-125mg (AUGMENTIN) 875-125 mg per tablet; Take 1 tablet by mouth every 12 (twelve) hours.  Dispense: 20 tablet; Refill: 0      45 minutes spent with patient, of which >50% was spent on counseling and coordination of care.     Warning signs discussed, patient to call with any further issues or worsening of symptoms.

## 2017-11-30 ENCOUNTER — OFFICE VISIT (OUTPATIENT)
Dept: INFECTIOUS DISEASES | Facility: CLINIC | Age: 42
End: 2017-11-30
Payer: OTHER GOVERNMENT

## 2017-11-30 ENCOUNTER — OFFICE VISIT (OUTPATIENT)
Dept: OTOLARYNGOLOGY | Facility: CLINIC | Age: 42
End: 2017-11-30
Payer: OTHER GOVERNMENT

## 2017-11-30 VITALS
HEART RATE: 81 BPM | TEMPERATURE: 98 F | BODY MASS INDEX: 23.18 KG/M2 | DIASTOLIC BLOOD PRESSURE: 102 MMHG | HEIGHT: 64 IN | WEIGHT: 135.81 LBS | SYSTOLIC BLOOD PRESSURE: 151 MMHG

## 2017-11-30 VITALS
SYSTOLIC BLOOD PRESSURE: 175 MMHG | HEART RATE: 77 BPM | TEMPERATURE: 98 F | DIASTOLIC BLOOD PRESSURE: 90 MMHG | WEIGHT: 135.56 LBS | BODY MASS INDEX: 23.27 KG/M2
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
## 2017-11-30 DIAGNOSIS — L02.91 ABSCESS: Primary | ICD-10-CM

## 2017-11-30 DIAGNOSIS — Z72.0 TOBACCO USER: ICD-10-CM

## 2017-11-30 DIAGNOSIS — S11.90XS: ICD-10-CM

## 2017-11-30 DIAGNOSIS — R22.1 NECK SWELLING: Primary | ICD-10-CM

## 2017-11-30 PROCEDURE — 99203 OFFICE O/P NEW LOW 30 MIN: CPT | Mod: S$PBB,,, | Performed by: INTERNAL MEDICINE

## 2017-11-30 PROCEDURE — 99999 PR PBB SHADOW E&M-EST. PATIENT-LVL III: CPT | Mod: PBBFAC,,, | Performed by: OTOLARYNGOLOGY

## 2017-11-30 PROCEDURE — 99203 OFFICE O/P NEW LOW 30 MIN: CPT | Mod: S$PBB,,, | Performed by: OTOLARYNGOLOGY

## 2017-11-30 PROCEDURE — 99999 PR PBB SHADOW E&M-EST. PATIENT-LVL III: CPT | Mod: PBBFAC,,, | Performed by: INTERNAL MEDICINE

## 2017-11-30 PROCEDURE — 99213 OFFICE O/P EST LOW 20 MIN: CPT | Mod: PBBFAC,27 | Performed by: OTOLARYNGOLOGY

## 2017-11-30 PROCEDURE — 99213 OFFICE O/P EST LOW 20 MIN: CPT | Mod: PBBFAC | Performed by: INTERNAL MEDICINE

## 2017-11-30 RX ORDER — AMOXICILLIN AND CLAVULANATE POTASSIUM 875; 125 MG/1; MG/1
1 TABLET, FILM COATED ORAL EVERY 12 HOURS
Qty: 60 TABLET | Refills: 0 | Status: SHIPPED | OUTPATIENT
Start: 2017-11-30 | End: 2017-12-30

## 2017-11-30 NOTE — PROGRESS NOTES
Chief Complaint   Patient presents with    consult/dog bite need to be drained       HPI   42 y.o. female presents for evaluation of R neck swelling.  She was admitted to the hospital ~3 weeks ago after sustaining a dog bite to the R neck ~1 week prior to that time.  She underwent I and D of a R neck abscess.  She reports that this site healed but ultimately became swollen ~1 week ago.  She denies pain or drainage from this site.  She does not feel that this area has enlarged.  She is currently taking Augmentin.     Review of Systems   Constitutional: Negative for fatigue and unexpected weight change.   HENT: Per HPI.  Eyes: Negative for visual disturbance.   Respiratory: Negative for shortness of breath, hemoptysis   Cardiovascular: Negative for chest pain and palpitations.   Musculoskeletal: Negative for decreased ROM, back pain.   Skin: Negative for rash, sunburn, itching.   Neurological: Negative for dizziness and seizures.   Hematological: Negative for adenopathy. Does not bruise/bleed easily.   Endocrine: Negative for rapid weight loss/weight gain, heat/cold intolerance.     Past Medical History   Patient Active Problem List   Diagnosis    Varicose veins    Superficial phlebitis and thrombophlebitis of the leg    Cellulitis of neck    Dog bite of neck    Elevated blood pressure reading    Tobacco user           Past Surgical History   Past Surgical History:   Procedure Laterality Date    CHOLECYSTECTOMY      laparoscopic    left vein stripping           Family History   Family History   Problem Relation Age of Onset    COPD Mother     Lung disease Mother     Heart disease Mother     Cancer Neg Hx            Social History   .  Social History     Social History    Marital status:      Spouse name: N/A    Number of children: N/A    Years of education: N/A     Occupational History    Not on file.     Social History Main Topics    Smoking status: Former Smoker    Smokeless tobacco: Never  Used      Comment: quit 10 years ago    Alcohol use Yes      Comment: socially    Drug use: No    Sexual activity: Yes     Partners: Male     Birth control/ protection: None     Other Topics Concern    Not on file     Social History Narrative    No narrative on file         Allergies   Review of patient's allergies indicates:   Allergen Reactions    Codeine Nausea Only           Physical Exam     Vitals:    11/30/17 1405   BP: (!) 175/90   Pulse: 77   Temp: 97.7 °F (36.5 °C)         Body mass index is 23.27 kg/m².      General: AOx3, NAD   Respiratory:  Symmetric chest rise, normal effort  Right Ear: External Auditory Canal WNL,TM w/o masses/lesions/perforations.  Middle ear without evidence of effusion.   Left Ear: External Auditory Canal WNL,TM w/o masses/lesions/perforations.  Middle ear without evidence of effusion.   Nose: No gross nasal septal deviation. Inferior Turbinates WNL bilaterally. No septal perforation. No masses/lesions.   Oral Cavity:  Oral Tongue mobile, no lesions noted. Hard Palate WNL. No buccal or FOM lesions.  Oropharynx:  No masses/lesions of the posterior pharyngeal wall. Tonsillar fossa without lesions. Soft palate without masses. Midline uvula.   Neck: R neck with ~2 cm firm swelling at the level of the hyoid.  Well-healed I and D scar.  No drainage.  No warmth or erythema.  Nontender.  No cervical lymphadenopathy, thyromegaly or thyroid nodules.  Normal range of motion.    Face: House Brackmann I bilaterally.     Assessment/Plan  Problem List Items Addressed This Visit     None      Visit Diagnoses     Neck swelling    -  Primary    Relevant Orders    CT Soft Tissue Neck With Contrast    Status post dog bite.  She is not acutely inflamed and there is no jovan evidence of abscess today so I do not feel that I and D is warranted.  Considerations include an inflammatory process or a subacute infection such as a mycobacterial infection.  CT neck ordered to assess in greater detail.  I  will contact her with results and we will finalize a treatment plan at that time.

## 2017-11-30 NOTE — LETTER
November 30, 2017      Man Montague, DO  [address] [place] [address]           [facility]  [address]  [facility] [address]  Phone: [phone]  Fax: [phone]          Patient: Alis Villagomez   MR Number: [id number]   YOB: 1975   Date of Visit: 11/30/2017       Dear Dr. Man Montague:    Thank you for referring Alis Villagomez to me for evaluation. Attached you will find relevant portions of my assessment and plan of care.    If you have questions, please do not hesitate to call me. I look forward to following Alis Villagomez along with you.    Sincerely,    Chad Dee MD    Enclosure  CC:  No Recipients    If you would like to receive this communication electronically, please contact [email or web address] or [phone] to request more information on [employer] Link access.    For providers and/or their staff who would like to refer a patient to [facility], please contact us through our one-stop-shop provider referral line, [facility], at [phone].    If you feel you have received this communication in error or would no longer like to receive these types of communications, please e-mail [email or web address]

## 2017-11-30 NOTE — LETTER
November 30, 2017      Man Montague, DO  [address] [facility]  [place] [address]           [facility] Infectious [facility]  [address]  [facility] [address]  Phone: [phone]  Fax: [phone]          Patient: Alis Villagomez   MR Number: [id number]   YOB: 1975   Date of Visit: 11/30/2017       Dear Dr. Man Montague:    Thank you for referring Alis Villagomez to me for evaluation. Attached you will find relevant portions of my assessment and plan of care.    If you have questions, please do not hesitate to call me. I look forward to following Alis Villagomez along with you.    Sincerely,    Sammie Polk MD    Enclosure  CC:  No Recipients    If you would like to receive this communication electronically, please contact [email or web address] or [phone] to request more information on [employer] Link access.    For providers and/or their staff who would like to refer a patient to [facility], please contact us through our one-stop-shop provider referral line, [facility], at [phone].    If you feel you have received this communication in error or would no longer like to receive these types of communications, please e-mail [email or web address]

## 2017-11-30 NOTE — PROGRESS NOTES
Subjective:      Patient ID: Alis Villagomez is a 42 y.o. female.    Chief Complaint: Evaluation of dog bite    History of Present Illness  42-year-old female who presents for evaluation of recurrent neck swelling after dog bite. Patient presented to ED 11/5/2017 with neck swelling, redness, and purulent drainage from wound after a dog bite about a week earlier. CT neck showed soft tissue inflammation with subcutaneous emphysema in right superolateral neck. I&D was performed - cultures grew Pasteurella species and anaerobes (prevotella, P. Acnes, Bacteroides pyogenes). Patient was initially treated with clinda and cipro, then discharged on a 10 day course of amox-clav. About 4-5 days later, patient started having recurrent swelling in her neck. About a week later, she presented to Family Medicine clinic for evaluation. She was sent home on amox-clav with follow-up with ID and ENT.     Patient denies having any pain or redness over neck. Only complaining of swelling and new area of firmness. Denied having any fevers, chills, night sweats. Denied having any other symptoms, no neck stiffness, dysphagia, odynophagia. Denied any n/v/d, abdominal pain, hematuria, dysuria.      Review of Systems   Constitution: Negative for chills, decreased appetite, fever, weakness, malaise/fatigue, night sweats, weight gain and weight loss.   HENT: Negative for congestion, ear pain, hearing loss, hoarse voice, sore throat and tinnitus.    Eyes: Negative for blurred vision, redness and visual disturbance.   Cardiovascular: Negative for chest pain, leg swelling and palpitations.   Respiratory: Negative for cough, hemoptysis, shortness of breath and sputum production.    Hematologic/Lymphatic: Negative for adenopathy. Does not bruise/bleed easily.   Skin: Negative for dry skin, itching, rash and suspicious lesions.   Musculoskeletal: Negative for back pain, joint pain, myalgias and neck pain.   Gastrointestinal: Negative for  abdominal pain, constipation, diarrhea, heartburn, nausea and vomiting.   Genitourinary: Negative for dysuria, flank pain, frequency, hematuria, hesitancy and urgency.   Neurological: Negative for dizziness, headaches, numbness and paresthesias.   Psychiatric/Behavioral: Negative for depression and memory loss. The patient is nervous/anxious. The patient does not have insomnia.      Objective:   Physical Exam   Constitutional: She is oriented to person, place, and time. She appears well-developed and well-nourished. No distress.   HENT:   Head: Normocephalic and atraumatic.   Right neck with 4 cm area of fluctuance - no surrounding redness, pain, swelling.   Eyes: Conjunctivae are normal.   Neck: Normal range of motion. Neck supple.   Cardiovascular: Normal rate.    Pulmonary/Chest: Effort normal. No respiratory distress.   Abdominal: Soft. She exhibits no distension.   Musculoskeletal: Normal range of motion. She exhibits no edema.   Neurological: She is alert and oriented to person, place, and time.   Skin: Skin is warm and dry. No rash noted. She is not diaphoretic. No erythema.   Psychiatric: She has a normal mood and affect. Her behavior is normal.     Significant labs reviewed:    11/5/2017 Anaerobic culture  PREVOTELLA SPECIES Rare further identified as Prevotella heparinolytica    BACTEROIDES PYOGENES Few    PROPIONIBACTERIUM ACNES Few      11/5/2017 Aerobic culture  PASTEURELLA SPECIES Rare Pasteurella canis Beta Lactamase negative    PASTEURELLA SPECIES Rare Pasteurella dagmatis Beta Lactamase negative      Significant imaging reviewed - personally reviewed image  11/5/2017 - Right neck with subcutaneous emphysema      Assessment:   42-year-old female  - Dog bite c/b  - Neck abscess - Pasteurella and anaerobes (prevotella, bacteroides, P. Acnes)  - s/p I&D 11/5/2017  - Presents with recurrent neck abscess    Suspect recurrent abscess related to incomplete drainage of prior abscess - patient needs  additional source control of infection with I&D and possibly packing - low suspicion for resistant organisms.      Plan:   - Follow-up with ENT today- needs repeat I&D of neck abscess  - Continue amox-clav PO BID, will continue until resolution of symptoms  - Follow-up in 2 weeks    Sammie Polk MD MPH  Infectious Diseases [facility]

## 2017-12-01 ENCOUNTER — HOSPITAL ENCOUNTER (OUTPATIENT)
Dept: RADIOLOGY | Facility: HOSPITAL | Age: 42
Discharge: HOME | End: 2017-12-01
Attending: OTOLARYNGOLOGY
Payer: OTHER GOVERNMENT

## 2017-12-01 ENCOUNTER — PATIENT MESSAGE (OUTPATIENT)
Dept: OTOLARYNGOLOGY | Facility: CLINIC | Age: 42
End: 2017-12-01

## 2017-12-01 DIAGNOSIS — R22.1 NECK SWELLING: ICD-10-CM

## 2017-12-01 PROCEDURE — 25500020 PHARM REV CODE 255: Performed by: OTOLARYNGOLOGY

## 2017-12-01 PROCEDURE — 70491 CT SOFT TISSUE NECK W/DYE: CPT | Mod: 26,,, | Performed by: RADIOLOGY

## 2017-12-01 PROCEDURE — 70491 CT SOFT TISSUE NECK W/DYE: CPT | Mod: TC

## 2017-12-01 RX ADMIN — IOHEXOL 75 ML: 350 INJECTION, SOLUTION INTRAVENOUS at 10:12

## 2020-10-08 PROCEDURE — 10060 I&D ABSCESS SIMPLE/SINGLE: CPT

## 2020-10-08 PROCEDURE — 81025 URINE PREGNANCY TEST: CPT | Performed by: NURSE PRACTITIONER

## 2020-10-08 PROCEDURE — 99284 EMERGENCY DEPT VISIT MOD MDM: CPT | Mod: 25

## 2020-10-08 PROCEDURE — 96372 THER/PROPH/DIAG INJ SC/IM: CPT | Mod: 59

## 2020-10-09 ENCOUNTER — HOSPITAL ENCOUNTER (EMERGENCY)
Facility: HOSPITAL | Age: 45
Discharge: HOME | End: 2020-10-09
Attending: EMERGENCY MEDICINE
Payer: OTHER GOVERNMENT

## 2020-10-09 VITALS
OXYGEN SATURATION: 100 % | SYSTOLIC BLOOD PRESSURE: 190 MMHG | WEIGHT: 150.56 LBS | RESPIRATION RATE: 16 BRPM | BODY MASS INDEX: 25.7 KG/M2 | TEMPERATURE: 99 F | HEART RATE: 67 BPM | DIASTOLIC BLOOD PRESSURE: 100 MMHG | HEIGHT: 64 IN

## 2020-10-09 DIAGNOSIS — L72.3 SEBACEOUS CYST: Primary | ICD-10-CM

## 2020-10-09 LAB
B-HCG UR QL: NEGATIVE
CTP QC/QA: YES

## 2020-10-09 PROCEDURE — 25000003 PHARM REV CODE 250: Performed by: EMERGENCY MEDICINE

## 2020-10-09 PROCEDURE — 63600175 PHARM REV CODE 636 W HCPCS: Performed by: EMERGENCY MEDICINE

## 2020-10-09 PROCEDURE — 10060 I&D ABSCESS SIMPLE/SINGLE: CPT

## 2020-10-09 RX ORDER — SULFAMETHOXAZOLE AND TRIMETHOPRIM 800; 160 MG/1; MG/1
1 TABLET ORAL 2 TIMES DAILY
Qty: 14 TABLET | Refills: 0 | Status: SHIPPED | OUTPATIENT
Start: 2020-10-09 | End: 2020-10-16

## 2020-10-09 RX ORDER — LIDOCAINE HYDROCHLORIDE 20 MG/ML
100 INJECTION INTRAVENOUS
Status: DISCONTINUED | OUTPATIENT
Start: 2020-10-09 | End: 2020-10-09

## 2020-10-09 RX ORDER — LIDOCAINE HYDROCHLORIDE 10 MG/ML
10 INJECTION INFILTRATION; PERINEURAL
Status: COMPLETED | OUTPATIENT
Start: 2020-10-09 | End: 2020-10-09

## 2020-10-09 RX ADMIN — LORAZEPAM 2 MG: 2 INJECTION INTRAMUSCULAR; INTRAVENOUS at 01:10

## 2020-10-09 RX ADMIN — LIDOCAINE HYDROCHLORIDE 10 ML: 10 INJECTION, SOLUTION INFILTRATION; PERINEURAL at 01:10

## 2020-10-09 NOTE — FIRST PROVIDER EVALUATION
Emergency Department TeleTriage Encounter Note      CHIEF COMPLAINT    Chief Complaint   Patient presents with    Cyst     Bump to left side of upper chest.  States she has had a very tiny cyst for 20 years.  Now it is red and swollen.       VITAL SIGNS   Initial Vitals [10/08/20 2256]   BP Pulse Resp Temp SpO2   (!) 193/100 105 18 98 °F (36.7 °C) 100 %      MAP       --            ALLERGIES    Review of patient's allergies indicates:   Allergen Reactions    Codeine Nausea Only       PROVIDER TRIAGE NOTE  46 y/o female which presents with a cyst to the left side of her chest. States she had a cyst there for over 20 years and a few days ago it became warm and red.       ORDERS  Labs Reviewed   POCT URINE PREGNANCY       ED Orders (720h ago, onward)    Start Ordered     Status Ordering Provider    10/08/20 2304 10/08/20 2303  POCT urine pregnancy  Once      Ordered CARLOS GUERRIER            Virtual Visit Note: The provider triage portion of this emergency department evaluation and documentation was performed via [employer], a HIPAA-compliant telemedicine application, in concert with a tele-presenter in the room. A face to face patient evaluation with one of my colleagues will occur once the patient is placed in an emergency department room.      DISCLAIMER: This note was prepared with [email or web address] voice recognition transcription software. Garbled syntax, mangled pronouns, and other bizarre constructions may be attributed to that software system.

## 2020-10-09 NOTE — ED PROVIDER NOTES
Encounter Date: 10/8/2020    SCRIBE #1 NOTE: I, Lovely Dave, am scribing for, and in the presence of,  Brandon Roth Jr, MD. I have scribed the entire note.       History     Chief Complaint   Patient presents with    Cyst     Bump to left side of upper chest.  States she has had a very tiny cyst for 20 years.  Now it is red and swollen.     Alis Villagomez is a 45 y.o. female who  has a past medical history of Varicose veins.    The patient presents to the ED due to enlarged cyst at upper chest since Monday (10/05). She states that she has had this cyst for many years, but on Monday she noticed it was enlarged and red. The patient denies fever, h/o abscess, or any other concerning symptoms.      The history is provided by the patient.     Review of patient's allergies indicates:   Allergen Reactions    Codeine Nausea Only     Past Medical History:   Diagnosis Date    Varicose veins      Past Surgical History:   Procedure Laterality Date    CHOLECYSTECTOMY      laparoscopic    left vein stripping       Family History   Problem Relation Age of Onset    COPD Mother     Lung disease Mother     Heart disease Mother     Cancer Neg Hx      Social History     Tobacco Use    Smoking status: Former Smoker    Smokeless tobacco: Never Used    Tobacco comment: quit 10 years ago   Substance Use Topics    Alcohol use: Yes     Comment: socially    Drug use: No     Review of Systems   Constitutional: Negative for fever.   Skin:        Abscess at chest       Physical Exam     Initial Vitals [10/08/20 2256]   BP Pulse Resp Temp SpO2   (!) 193/100 105 18 98 °F (36.7 °C) 100 %      MAP       --         Physical Exam    Constitutional:  Non-toxic appearance.   Anxious appearing   HENT:   Head: Normocephalic and atraumatic.   Eyes: EOM are normal. Right eye exhibits no nystagmus. Left eye exhibits no nystagmus.   Neck: Normal range of motion.   Cardiovascular: S1 normal and S2 normal.   Musculoskeletal: Normal  range of motion.   Neurological: She is alert. GCS eye subscore is 4. GCS verbal subscore is 5. GCS motor subscore is 6.   Skin: Skin is warm. No rash and no abscess (2 x 2 cm area of fluctuance, with central pustule   ) noted.   Psychiatric: She has a normal mood and affect. Her behavior is normal.         ED Course   I & D - Incision and Drainage    Date/Time: 10/9/2020 2:00 AM  Location procedure was performed: [facility] EMERGENCY DEPARTMENT  Performed by: Brandon Roth Jr., MD  Authorized by: Brandon Rtoh Jr., MD   Pre-operative diagnosis: cyst  Post-operative diagnosis: cyst  Consent Done: Emergent Situation  Type: cyst  Body area: trunk  Location details: chest    Anesthesia:  Local Anesthetic: lidocaine 1% without epinephrine  Patient sedated: no  Scalpel size: 11  Incision type: single straight  Complexity: simple  Drainage: pus  Drainage amount: copious  Wound treatment: wound packed,  wound left open,  sebum removal and  expression of material  Packing material: 1/4 in iodoform gauze        Labs Reviewed   POCT URINE PREGNANCY          Imaging Results    None          Medical Decision Making:   Differential Diagnosis:   Differential Diagnosis includes, but is not limited to:  Cyst, Cellulitis, abscess,   Clinical Tests:   Lab Tests: Reviewed and Ordered  ED Management:  45-year-old female presenting today with since  now with some purulent drainage.  Cyst was removed wound was packed.  Will discharge with antibiotics check and repacking 2 days.  Return precautions for increased pain swelling fever or any other concerns.    After taking into careful account the historical factors and physical exam findings of the patient's presentation today, in conjunction with the empirical and objective data obtained on ED workup, no acute emergent medical condition has been identified. The patient appears to be low risk for an emergent medical condition and I feel it is safe and appropriate at this time for the  patient to be discharged to follow-up as detailed in their discharge instructions for reevaluation and possible continued outpatient workup and management. I have discussed the specifics of the workup with the patient and the patient has verbalized understanding of the details of the workup, the diagnosis, the treatment plan, and the need for outpatient follow-up.  Although the patient has no emergent etiology today this does not preclude the development of an emergent condition so in addition, I have advised the patient that they can return to the ED and/or activate EMS at any time with worsening of their symptoms, change of their symptoms, or with any other medical complaint.  The patient remained comfortable and stable during their visit in the ED.  Discharge and follow-up instructions discussed with the patient who expressed understanding and willingness to comply with my recommendations.                               Clinical Impression:     ICD-10-CM ICD-9-CM   1. Sebaceous cyst  L72.3 706.2                          ED Disposition Condition    Discharge Stable        ED Prescriptions     Medication Sig Dispense Start Date End Date Auth. Provider    sulfamethoxazole-trimethoprim 800-160mg (BACTRIM DS) 800-160 mg Tab Take 1 tablet by mouth 2 (two) times daily. for 7 days 14 tablet 10/9/2020 10/16/2020 Brandon Roth Jr., MD        Follow-up Information     Follow up With Specialties Details Why Contact Info    Rochelle Byrd MD Family Medicine In 2 days For wound re-check [address] [facility] [place] [address]  [phone]                        Scribe Attestation I, Brandon Roth,  personally performed the services described in this documentation. All medical record entries made by the scribe were at my direction and in my presence.  I have reviewed the chart and agree that the record reflects my personal performance and is accurate and complete. Brandon Roth M.D. 3:02 PM10/10/2020        Portions of this  note were dictated using voice recognition software and may contain dictation related errors in spelling/grammar/syntax not found on text review                     Brandon Roth Jr., MD  10/10/20 3305

## 2020-10-09 NOTE — ED TRIAGE NOTES
Pt. To the ER with c/o redness to the left chest wall that began yesterday. Pt. C/o having a cyst to the left upper chest wall for years, but has never had redness to the area before. Bed in the low position, side rails elevated x 2 and call light at the bedside.

## 2020-10-11 ENCOUNTER — HOSPITAL ENCOUNTER (EMERGENCY)
Facility: HOSPITAL | Age: 45
Discharge: HOME | End: 2020-10-11
Attending: EMERGENCY MEDICINE
Payer: OTHER GOVERNMENT

## 2020-10-11 VITALS
TEMPERATURE: 99 F | SYSTOLIC BLOOD PRESSURE: 151 MMHG | OXYGEN SATURATION: 99 % | BODY MASS INDEX: 26.46 KG/M2 | WEIGHT: 155 LBS | HEART RATE: 90 BPM | DIASTOLIC BLOOD PRESSURE: 107 MMHG | RESPIRATION RATE: 18 BRPM | HEIGHT: 64 IN

## 2020-10-11 DIAGNOSIS — Z98.890 STATUS POST INCISION AND DRAINAGE: ICD-10-CM

## 2020-10-11 DIAGNOSIS — L72.3 SEBACEOUS CYST: Primary | ICD-10-CM

## 2020-10-11 PROCEDURE — 10120 INC&RMVL FB SUBQ TISS SMPL: CPT

## 2020-10-11 PROCEDURE — 99283 EMERGENCY DEPT VISIT LOW MDM: CPT | Mod: 25

## 2020-10-11 RX ORDER — ONDANSETRON 4 MG/1
4 TABLET, FILM COATED ORAL EVERY 6 HOURS
Qty: 12 TABLET | Refills: 0 | Status: SHIPPED | OUTPATIENT
Start: 2020-10-11 | End: 2020-10-14

## 2020-10-11 NOTE — ED NOTES
Present to ED for wound check. Reports I&D of right chest wall cyst x 2 days ago here for packing change. No acute distress. Right chest wall wound covered with adhesive telfa dressing. Pt tolerated well.

## 2020-10-11 NOTE — ED PROVIDER NOTES
Encounter Date: 10/11/2020       History     Chief Complaint   Patient presents with    Wound Check     pt had sebaceous cyst removed from chest wall 2 days ago, returns to ed today for packing removal      Alis Villagomez is a 45 y.o. female who  has a past medical history of Varicose veins.    The patient presents to the ED for wound re-check.  She was seen in the ER 2 days ago and underwent an I & D of a sebaceous cyst to the left upper chest wall.  She was started on antibiotics.  The wound is repacked, and she was instructed to follow up in the ER in 2 days for a recheck.  She has been compliant with antibiotics, and states the pain and drainage has improved.  She denies any fever.  She does reports some mild nausea but denies vomiting, diarrhea, abdominal pain, headache, rash, or any other concerns.        Review of patient's allergies indicates:   Allergen Reactions    Codeine Nausea Only     Past Medical History:   Diagnosis Date    Varicose veins      Past Surgical History:   Procedure Laterality Date    CHOLECYSTECTOMY      laparoscopic    left vein stripping       Family History   Problem Relation Age of Onset    COPD Mother     Lung disease Mother     Heart disease Mother     Cancer Neg Hx      Social History     Tobacco Use    Smoking status: Former Smoker    Smokeless tobacco: Never Used    Tobacco comment: quit 10 years ago   Substance Use Topics    Alcohol use: Yes     Comment: socially    Drug use: No     Review of Systems   Constitutional: Negative for chills and fever.   HENT: Negative for sore throat.    Respiratory: Negative for cough and shortness of breath.    Cardiovascular: Negative for chest pain.   Gastrointestinal: Negative for nausea and vomiting.   Genitourinary: Negative for dysuria, frequency and urgency.   Musculoskeletal: Negative for back pain.   Skin: Positive for wound. Negative for rash.   Neurological: Negative for syncope and weakness.   Hematological:  Does not bruise/bleed easily.   Psychiatric/Behavioral: Negative for agitation, behavioral problems and confusion.       Physical Exam     Initial Vitals [10/11/20 1617]   BP Pulse Resp Temp SpO2   (!) 170/99 90 17 98.8 °F (37.1 °C) 99 %      MAP       --         Physical Exam    Nursing note and vitals reviewed.  Constitutional: She appears well-developed and well-nourished. She is not diaphoretic. No distress.   HENT:   Head: Normocephalic and atraumatic.   Mouth/Throat: Oropharynx is clear and moist.   Eyes: EOM are normal. Pupils are equal, round, and reactive to light.   Neck: No tracheal deviation present.   Cardiovascular: Normal rate, regular rhythm, normal heart sounds and intact distal pulses.   No murmur heard.   No systolic murmur is present.      Pulmonary/Chest: Breath sounds normal. No stridor. No respiratory distress. She has no wheezes.   Approximately 1 cm skin opening overlying sebaceous cyst to L chest wall.  Packing material with small amount of serosanguinous drainage.   Packing material removed, no active bleeding or drainage.  No skin changes, erythema, or fluctuance noted.   Abdominal: Soft. Bowel sounds are normal. She exhibits no distension and no mass. There is no abdominal tenderness.   Musculoskeletal: Normal range of motion. No edema.   Neurological: She is alert and oriented to person, place, and time. She has normal strength. No cranial nerve deficit or sensory deficit.   Skin: Skin is warm and dry. Capillary refill takes less than 2 seconds. No pallor.   Psychiatric: She has a normal mood and affect. Her behavior is normal. Thought content normal.         ED Course   Foreign Body    Date/Time: 10/11/2020 4:33 PM  Performed by: Denzel Alas MD  Authorized by: Denzel Alas MD   Consent Done: Not Needed  Body area: skin  General location: trunk  Location details: chest  Localization method: visualized  Depth: subcutaneous  Complexity: simple  1 objects recovered.  Objects  recovered: packing material  Post-procedure assessment: foreign body removed  Patient tolerance: Patient tolerated the procedure well with no immediate complications      Labs Reviewed - No data to display       Imaging Results    None          Medical Decision Making:   History:   Old Medical Records: I decided to obtain old medical records.  Old Records Summarized: other records.       <> Summary of Records: Patient seen in ED 10/9, diagnosed with sebaceous cyst.  I and D performed.  Wound packed with gauze material.  Patient started on Bactrim.  Instructed to follow-up in 2 days for wound re-evaluation.  Zofran RX given due to mild nausea, likely from ABX.   Initial Assessment:   46 yo F with sebaceous cyst s/p I&D.  Vitals reassuring, afebrile.  Exam reveals open incision overlying the chest wall, cyst cavity underlying without active purulence or drainage.  Skin appears well without signs of cellulitis or infection.  No indication for repacking at this time.  Patient instructed to continue antibiotics until completion.  Patient will be provided with Dermatology or General surgery Clinic information to obtain follow-up and plan definitive cyst excision if desired.  Patient stable for discharge.  Differential Diagnosis:   Differential Diagnosis includes, but is not limited to:  Cellulitis, abscess, necrotizing fasciitis, osteomyelitis, septic joint, MRSA, DVT, drug eruption, allergic/contact dermatitis, EM/SJS, viral exanthem, local trauma/contusion      On re-evaluation, the patient's status has improved.  After complete ED evaluation, clinical impression is most consistent with sebaceous cyst.  Surgery/Dermatology follow-up within 2-3 days was recommended.    After taking into careful account the patient's history, physical exam findings, as well as empirical and objective data obtained throughout ED workup, I feel no emergent medical condition has been identified. No further evaluation or admission was felt to  be required, and the patient is stable for discharge from the ED. The patient and any additional family present were updated with test results, overall clinical impression, and recommended further plan of care, including discharge instructions as provided and outpatient follow-up for continued evaluation and management as needed. All questions were answered. The patient expressed understanding and agreed with current plan for discharge and follow-up plan of care. Strict ED return precautions were provided, including return/worsening of current symptoms, new symptoms, or any other concerns.                               Clinical Impression:     ICD-10-CM ICD-9-CM   1. Sebaceous cyst  L72.3 706.2   2. Status post incision and drainage  Z98.890 V45.89                          ED Disposition Condition    Discharge Stable        ED Prescriptions     Medication Sig Dispense Start Date End Date Auth. Provider    ondansetron (ZOFRAN) 4 MG tablet Take 1 tablet (4 mg total) by mouth every 6 (six) hours. for 3 days 12 tablet 10/11/2020 10/14/2020 Denzel Alas MD        Follow-up Information     Follow up With Specialties Details Why Contact Info Additional Information    [facility] Dermatology Dermatology Schedule an appointment as soon as possible for a visit   [address]  [facility] [address] [phone] 5th Floor    [facility] General Surgery Surgery Schedule an appointment as soon as possible for a visit   [address], [facility] [address]  [facility] [address] [phone] 4th Floor [facility], [address] At this time [facility] will only use these entries [facility], [facility], and Emergency Department due to COVID-19 precautions.                                        Denzel Alas MD  10/11/20 0441

## 2020-10-26 ENCOUNTER — OFFICE VISIT (OUTPATIENT)
Dept: SURGERY | Facility: CLINIC | Age: 45
End: 2020-10-26
Payer: OTHER GOVERNMENT

## 2020-10-26 VITALS — HEART RATE: 78 BPM | HEIGHT: 64 IN | WEIGHT: 149.56 LBS | BODY MASS INDEX: 25.53 KG/M2

## 2020-10-26 DIAGNOSIS — L03.221 CELLULITIS OF NECK: Primary | ICD-10-CM

## 2020-10-26 PROCEDURE — 99212 OFFICE O/P EST SF 10 MIN: CPT | Mod: PBBFAC,PO | Performed by: STUDENT IN AN ORGANIZED HEALTH CARE EDUCATION/TRAINING PROGRAM

## 2020-10-26 PROCEDURE — 99999 PR PBB SHADOW E&M-EST. PATIENT-LVL II: CPT | Mod: PBBFAC,,, | Performed by: STUDENT IN AN ORGANIZED HEALTH CARE EDUCATION/TRAINING PROGRAM

## 2020-10-26 PROCEDURE — 99202 OFFICE O/P NEW SF 15 MIN: CPT | Mod: S$PBB,,, | Performed by: STUDENT IN AN ORGANIZED HEALTH CARE EDUCATION/TRAINING PROGRAM

## 2020-10-26 PROCEDURE — 99999 PR PBB SHADOW E&M-EST. PATIENT-LVL II: ICD-10-PCS | Mod: PBBFAC,,, | Performed by: STUDENT IN AN ORGANIZED HEALTH CARE EDUCATION/TRAINING PROGRAM

## 2020-10-26 PROCEDURE — 99202 PR OFFICE/OUTPT VISIT, NEW, LEVL II, 15-29 MIN: ICD-10-PCS | Mod: S$PBB,,, | Performed by: STUDENT IN AN ORGANIZED HEALTH CARE EDUCATION/TRAINING PROGRAM

## 2020-10-26 NOTE — LETTER
October 26, 2020      Denzel Alas MD  [address] [facility] [address]           [facility]  [address], [facility] [address]  [facility] [address]  Phone: [phone]          Patient: Alis Villagomez   MR Number: [id number]   YOB: 1975   Date of Visit: 10/26/2020       Dear Dr. Denzel Alas:    Thank you for referring Alis Villagomez to me for evaluation. Attached you will find relevant portions of my assessment and plan of care.    If you have questions, please do not hesitate to call me. I look forward to following Alis Villagomez along with you.    Sincerely,    Carl Rubio MD    Enclosure  CC:  No Recipients    If you would like to receive this communication electronically, please contact [email or web address] or [phone] to request more information on [employer] access.    For providers and/or their staff who would like to refer a patient to [facility], please contact us through our one-stop-shop provider referral line, [facility], at [phone].    If you feel you have received this communication in error or would no longer like to receive these types of communications, please e-mail [email or web address]

## 2020-10-26 NOTE — PROGRESS NOTES
Patient ID: Alis Villagomez is a 45 y.o. female.    Chief Complaint: No chief complaint on file.      HPI:  45F with left chest wall infected sebaceous cyst s/p I&D about 2 weeks ago in ED. Finished a week of bactrim. Feeling well now. No pain. No drainage. Knew about small skin cyst at that location for many years but never bothered her.     Review of Systems   Constitutional: Negative for fever.   HENT: Negative for trouble swallowing.    Respiratory: Negative for shortness of breath.    Cardiovascular: Negative for chest pain.   Gastrointestinal: Negative for abdominal pain, blood in stool, nausea and vomiting.   Genitourinary: Negative for dysuria.   Musculoskeletal: Negative for gait problem.   Skin: Negative for rash and wound.   Allergic/Immunologic: Negative for immunocompromised state.   Neurological: Negative for weakness.   Hematological: Does not bruise/bleed easily.   Psychiatric/Behavioral: Negative for agitation.       No current outpatient medications on file.     No current facility-administered medications for this visit.        Review of patient's allergies indicates:   Allergen Reactions    Codeine Nausea Only       Past Medical History:   Diagnosis Date    Varicose veins        Past Surgical History:   Procedure Laterality Date    CHOLECYSTECTOMY      laparoscopic    left vein stripping         Family History   Problem Relation Age of Onset    COPD Mother     Lung disease Mother     Heart disease Mother     Cancer Neg Hx        Social History     Socioeconomic History    Marital status:      Spouse name: Not on file    Number of children: Not on file    Years of education: Not on file    Highest education level: Not on file   Occupational History    Not on file   Social Needs    Financial resource strain: Not on file    Food insecurity     Worry: Not on file     Inability: Not on file    Transportation needs     Medical: Not on file     Non-medical: Not on file    Tobacco Use    Smoking status: Former Smoker    Smokeless tobacco: Never Used    Tobacco comment: quit 10 years ago   Substance and Sexual Activity    Alcohol use: Yes     Comment: socially    Drug use: No    Sexual activity: Yes     Partners: Male     Birth control/protection: None   Lifestyle    Physical activity     Days per week: Not on file     Minutes per session: Not on file    Stress: Not on file   Relationships    Social connections     Talks on phone: Not on file     Gets together: Not on file     Attends L[employer]-day service: Not on file     Active member of club or organization: Not on file     Attends meetings of clubs or organizations: Not on file     Relationship status: Not on file   Other Topics Concern    Not on file   Social History Narrative    Not on file       Vitals:    10/26/20 0953   Pulse: 78       Physical Exam  Constitutional:       General: She is not in acute distress.     Appearance: She is well-developed.   HENT:      Head: Normocephalic and atraumatic.   Eyes:      General: No scleral icterus.  Cardiovascular:      Rate and Rhythm: Normal rate.   Pulmonary:      Effort: Pulmonary effort is normal.      Breath sounds: No stridor.   Chest:       Abdominal:      General: There is no distension.      Palpations: Abdomen is soft.      Tenderness: There is no abdominal tenderness.   Lymphadenopathy:      Cervical: No cervical adenopathy.   Skin:     General: Skin is warm.      Findings: No erythema.   Neurological:      Mental Status: She is alert and oriented to person, place, and time.   Psychiatric:         Behavior: Behavior normal.         Assessment & Plan:   45F s/p I&D for infected sebaceous cyst  RTC in 1-2 months if feels like cyst returns  Nothing to do today

## 2022-06-25 ENCOUNTER — HOSPITAL ENCOUNTER (EMERGENCY)
Facility: HOSPITAL | Age: 47
Discharge: HOME | End: 2022-06-25
Attending: EMERGENCY MEDICINE
Payer: OTHER GOVERNMENT

## 2022-06-25 VITALS
TEMPERATURE: 98 F | HEART RATE: 84 BPM | RESPIRATION RATE: 20 BRPM | OXYGEN SATURATION: 100 % | SYSTOLIC BLOOD PRESSURE: 133 MMHG | DIASTOLIC BLOOD PRESSURE: 95 MMHG

## 2022-06-25 DIAGNOSIS — S90.861A INSECT BITE OF RIGHT FOOT, INITIAL ENCOUNTER: Primary | ICD-10-CM
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
## 2022-06-25 PROCEDURE — 99283 EMERGENCY DEPT VISIT LOW MDM: CPT

## 2022-06-25 RX ORDER — CEPHALEXIN 500 MG/1
500 CAPSULE ORAL EVERY 8 HOURS
Qty: 15 CAPSULE | Refills: 0 | Status: SHIPPED | OUTPATIENT
Start: 2022-06-25 | End: 2022-06-30

## 2022-06-25 NOTE — DISCHARGE INSTRUCTIONS

## 2022-06-25 NOTE — ED PROVIDER NOTES
Encounter Date: 6/25/2022       History     Chief Complaint   Patient presents with    Recurrent Skin Infections     Left foot misquito bite that is red and painful to touch. No drainage noted and no pus around site. Patient has been keeping it clean     47-year-old female presents to ED with concern of possible infected mosquito bite.  Patient reports she was bit by a mosquito on her right foot 2 days ago.  She reports she continued to scratch area and noticed it appearing more swollen and red today.  She has applied topical antibiotics.  She reports very minimal pain to area.  No fevers, chills, numbness, focal weakness.  No other acute complaints at this time.        Review of patient's allergies indicates:   Allergen Reactions    Codeine Nausea Only     Past Medical History:   Diagnosis Date    Varicose veins      Past Surgical History:   Procedure Laterality Date    CHOLECYSTECTOMY      laparoscopic    left vein stripping       Family History   Problem Relation Age of Onset    COPD Mother     Lung disease Mother     Heart disease Mother     Cancer Neg Hx      Social History     Tobacco Use    Smoking status: Former Smoker    Smokeless tobacco: Never Used    Tobacco comment: quit 10 years ago   Substance Use Topics    Alcohol use: Yes     Comment: socially    Drug use: No     Review of Systems   Constitutional: Negative for chills and fever.   Gastrointestinal: Negative for nausea and vomiting.   Skin: Positive for color change and wound.   Neurological: Negative for weakness and numbness.       Physical Exam     Initial Vitals [06/25/22 1524]   BP Pulse Resp Temp SpO2   (!) 133/95 84 20 98 °F (36.7 °C) 100 %      MAP       --         Physical Exam    Nursing note and vitals reviewed.  Constitutional: Vital signs are normal. She appears well-developed and well-nourished. She is cooperative. She does not have a sickly appearance. She does not appear ill. No distress.   HENT:   Head: Normocephalic  and atraumatic.   Eyes: EOM are normal.   Neck:   Normal range of motion.  Musculoskeletal:      Cervical back: Normal range of motion.     Neurological: She is alert. GCS eye subscore is 4. GCS verbal subscore is 5. GCS motor subscore is 6.   Skin:   Small insect bite noted to dorsal aspect of right foot near 1st MTP joint.  Surrounding erythema noted with mild warmth.  No abscess formation, induration or deep wound.  No drainage.  Mildly tender.   Psychiatric: She has a normal mood and affect. Her speech is normal and behavior is normal.         ED Course   Procedures  Labs Reviewed - No data to display       Imaging Results    None          Medications - No data to display  Medical Decision Making:   Initial Assessment:   Patient presents with concern of infected mosquito bite.  Afebrile.  On exam, insect bite noted to dorsal aspect of right foot near 1st MTP.  Surrounding erythema and mild warmth noted.  No deep wound or drainage.  Differential Diagnosis:   Insect bite, cellulitis, abscess, allergic reaction  ED Management:  Insect bite does have surrounding erythema and mild warmth.  No abscess formation.  Will continue with conservative care.  Prescription for Keflex.  Encouraged to continue home topical antibiotic, monitor wound healing closely, PCP follow-up.  ED return precautions discussed.  Patient states her understanding and agrees with plan.                      Clinical Impression:   Final diagnoses:  [S90.861A, W57.XXXA] Insect bite of right foot, initial encounter (Primary)          ED Disposition Condition    Discharge Stable        ED Prescriptions     Medication Sig Dispense Start Date End Date Auth. Provider    cephALEXin (KEFLEX) 500 MG capsule Take 1 capsule (500 mg total) by mouth every 8 (eight) hours. for 5 days 15 capsule 6/25/2022 6/30/2022 Matt Madrigal PA-C        Follow-up Information     Follow up With Specialties Details Why Contact Info    Rochelle Byrd MD Family Medicine   [address]  [facility] [address]  [phone]             Matt Madrigal PA-C  06/25/22 2846

## 2023-05-23 ENCOUNTER — OFFICE VISIT (OUTPATIENT)
Dept: FAMILY MEDICINE | Facility: CLINIC | Age: 48
End: 2023-05-23
Payer: OTHER GOVERNMENT

## 2023-05-23 DIAGNOSIS — R21 RASH: Primary | ICD-10-CM
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
## 2023-05-23 PROBLEM — S11.90XA: Status: RESOLVED | Noted: 2017-11-05 | Resolved: 2023-05-23

## 2023-05-23 PROCEDURE — 99203 PR OFFICE/OUTPT VISIT, NEW, LEVL III, 30-44 MIN: ICD-10-PCS | Mod: 95,,, | Performed by: FAMILY MEDICINE

## 2023-05-23 PROCEDURE — 99203 OFFICE O/P NEW LOW 30 MIN: CPT | Mod: 95,,, | Performed by: FAMILY MEDICINE

## 2023-05-23 NOTE — PROGRESS NOTES
Virtual Video Visits     Subjective:       Patient ID: Alis Villagomez is a 47 y.o. female.    Chief Complaint: Infection  48 yo female presents c/o blistery rash and redness on her face. Pt notes rash appeared 5 days ago. Rash improved today. Has been applying wound care solution and Neosporin.  Pt denies chronic dermatologic problems.  Rash  The current episode started in the past 7 days. The problem is unchanged. The affected locations include the face. The rash is characterized by redness. She was exposed to nothing. Pertinent negatives include no anorexia, congestion, cough, diarrhea, eye pain, facial edema, fatigue, fever, joint pain, nail changes, shortness of breath, sore throat or vomiting. Past treatments include antibiotic cream and cold compress. The treatment provided mild relief. There is no history of allergies, asthma, eczema or varicella.   Review of Systems   Constitutional:  Negative for chills, fatigue and fever.   HENT:  Negative for congestion and sore throat.    Eyes:  Negative for pain.   Respiratory:  Negative for cough and shortness of breath.    Gastrointestinal:  Negative for anorexia, diarrhea and vomiting.   Musculoskeletal:  Negative for joint pain.   Skin:  Positive for rash. Negative for nail changes.     Objective:      Physical Exam  Constitutional:       Appearance: Normal appearance.      Comments: Unable to assess skin through video   Neurological:      Mental Status: She is alert.       Assessment:       See plan  Plan:       Rash  - Pt advised that I am unable to adequately evaluate and assess her rash. Appt scheduled with Dulce Maria Sorenson NP on 5/25 for in person evaluation.             The patient location is:  Patient Home   The chief complaint leading to consultation is: Rash  Visit type: Virtual visit with synchronous audio and video  Total time spent with patient: 10 minutes  Each patient to whom he or she provides medical services by telemedicine is:  (1)  informed of the relationship between the physician and patient and the respective role of any other health care provider with respect to management of the patient; and (2) notified that he or she may decline to receive medical services by telemedicine and may withdraw from such care at any time.